# Patient Record
Sex: FEMALE | Race: WHITE | URBAN - METROPOLITAN AREA
[De-identification: names, ages, dates, MRNs, and addresses within clinical notes are randomized per-mention and may not be internally consistent; named-entity substitution may affect disease eponyms.]

---

## 2017-06-02 ENCOUNTER — OUTPATIENT (OUTPATIENT)
Dept: OUTPATIENT SERVICES | Facility: HOSPITAL | Age: 67
LOS: 1 days | Discharge: HOME | End: 2017-06-02

## 2017-06-28 DIAGNOSIS — E03.9 HYPOTHYROIDISM, UNSPECIFIED: ICD-10-CM

## 2017-06-28 DIAGNOSIS — E78.5 HYPERLIPIDEMIA, UNSPECIFIED: ICD-10-CM

## 2017-07-28 ENCOUNTER — OUTPATIENT (OUTPATIENT)
Dept: OUTPATIENT SERVICES | Facility: HOSPITAL | Age: 67
LOS: 1 days | Discharge: HOME | End: 2017-07-28

## 2017-07-28 DIAGNOSIS — Z12.31 ENCOUNTER FOR SCREENING MAMMOGRAM FOR MALIGNANT NEOPLASM OF BREAST: ICD-10-CM

## 2017-07-31 DIAGNOSIS — Z13.820 ENCOUNTER FOR SCREENING FOR OSTEOPOROSIS: ICD-10-CM

## 2017-07-31 DIAGNOSIS — Z78.0 ASYMPTOMATIC MENOPAUSAL STATE: ICD-10-CM

## 2017-07-31 DIAGNOSIS — M81.0 AGE-RELATED OSTEOPOROSIS WITHOUT CURRENT PATHOLOGICAL FRACTURE: ICD-10-CM

## 2017-10-03 ENCOUNTER — OUTPATIENT (OUTPATIENT)
Dept: OUTPATIENT SERVICES | Facility: HOSPITAL | Age: 67
LOS: 1 days | Discharge: HOME | End: 2017-10-03

## 2017-10-03 DIAGNOSIS — E55.9 VITAMIN D DEFICIENCY, UNSPECIFIED: ICD-10-CM

## 2017-10-03 DIAGNOSIS — E78.5 HYPERLIPIDEMIA, UNSPECIFIED: ICD-10-CM

## 2017-10-03 DIAGNOSIS — E03.9 HYPOTHYROIDISM, UNSPECIFIED: ICD-10-CM

## 2018-01-09 ENCOUNTER — OUTPATIENT (OUTPATIENT)
Dept: OUTPATIENT SERVICES | Facility: HOSPITAL | Age: 68
LOS: 1 days | Discharge: HOME | End: 2018-01-09

## 2018-01-09 DIAGNOSIS — M81.0 AGE-RELATED OSTEOPOROSIS WITHOUT CURRENT PATHOLOGICAL FRACTURE: ICD-10-CM

## 2018-01-09 DIAGNOSIS — E06.3 AUTOIMMUNE THYROIDITIS: ICD-10-CM

## 2018-01-09 DIAGNOSIS — E53.8 DEFICIENCY OF OTHER SPECIFIED B GROUP VITAMINS: ICD-10-CM

## 2018-01-09 DIAGNOSIS — E78.2 MIXED HYPERLIPIDEMIA: ICD-10-CM

## 2018-01-09 DIAGNOSIS — R53.83 OTHER FATIGUE: ICD-10-CM

## 2018-01-09 DIAGNOSIS — E55.9 VITAMIN D DEFICIENCY, UNSPECIFIED: ICD-10-CM

## 2018-05-02 ENCOUNTER — OUTPATIENT (OUTPATIENT)
Dept: OUTPATIENT SERVICES | Facility: HOSPITAL | Age: 68
LOS: 1 days | Discharge: HOME | End: 2018-05-02

## 2018-05-02 DIAGNOSIS — R53.83 OTHER FATIGUE: ICD-10-CM

## 2018-09-04 ENCOUNTER — OUTPATIENT (OUTPATIENT)
Dept: OUTPATIENT SERVICES | Facility: HOSPITAL | Age: 68
LOS: 1 days | Discharge: HOME | End: 2018-09-04

## 2018-09-04 DIAGNOSIS — E83.52 HYPERCALCEMIA: ICD-10-CM

## 2018-09-04 DIAGNOSIS — R53.83 OTHER FATIGUE: ICD-10-CM

## 2018-09-04 DIAGNOSIS — M81.0 AGE-RELATED OSTEOPOROSIS WITHOUT CURRENT PATHOLOGICAL FRACTURE: ICD-10-CM

## 2018-09-04 DIAGNOSIS — E78.2 MIXED HYPERLIPIDEMIA: ICD-10-CM

## 2018-09-04 DIAGNOSIS — I10 ESSENTIAL (PRIMARY) HYPERTENSION: ICD-10-CM

## 2018-12-03 ENCOUNTER — OUTPATIENT (OUTPATIENT)
Dept: OUTPATIENT SERVICES | Facility: HOSPITAL | Age: 68
LOS: 1 days | Discharge: HOME | End: 2018-12-03

## 2018-12-03 DIAGNOSIS — E78.5 HYPERLIPIDEMIA, UNSPECIFIED: ICD-10-CM

## 2018-12-03 DIAGNOSIS — E03.9 HYPOTHYROIDISM, UNSPECIFIED: ICD-10-CM

## 2019-05-09 ENCOUNTER — OUTPATIENT (OUTPATIENT)
Dept: OUTPATIENT SERVICES | Facility: HOSPITAL | Age: 69
LOS: 1 days | Discharge: HOME | End: 2019-05-09

## 2019-05-09 DIAGNOSIS — R53.83 OTHER FATIGUE: ICD-10-CM

## 2019-05-09 DIAGNOSIS — E78.2 MIXED HYPERLIPIDEMIA: ICD-10-CM

## 2019-05-09 DIAGNOSIS — E06.3 AUTOIMMUNE THYROIDITIS: ICD-10-CM

## 2019-07-08 ENCOUNTER — OUTPATIENT (OUTPATIENT)
Dept: OUTPATIENT SERVICES | Facility: HOSPITAL | Age: 69
LOS: 1 days | Discharge: HOME | End: 2019-07-08
Payer: MEDICARE

## 2019-07-08 DIAGNOSIS — Z12.31 ENCOUNTER FOR SCREENING MAMMOGRAM FOR MALIGNANT NEOPLASM OF BREAST: ICD-10-CM

## 2019-07-08 PROCEDURE — 77063 BREAST TOMOSYNTHESIS BI: CPT | Mod: 26

## 2019-07-08 PROCEDURE — 77067 SCR MAMMO BI INCL CAD: CPT | Mod: 26

## 2019-08-30 ENCOUNTER — TRANSCRIPTION ENCOUNTER (OUTPATIENT)
Age: 69
End: 2019-08-30

## 2019-09-03 ENCOUNTER — OUTPATIENT (OUTPATIENT)
Dept: OUTPATIENT SERVICES | Facility: HOSPITAL | Age: 69
LOS: 1 days | Discharge: HOME | End: 2019-09-03

## 2019-09-03 DIAGNOSIS — E78.5 HYPERLIPIDEMIA, UNSPECIFIED: ICD-10-CM

## 2019-09-03 DIAGNOSIS — E83.32 HEREDITARY VITAMIN D-DEPENDENT RICKETS (TYPE 1) (TYPE 2): ICD-10-CM

## 2019-09-03 DIAGNOSIS — I10 ESSENTIAL (PRIMARY) HYPERTENSION: ICD-10-CM

## 2019-09-17 ENCOUNTER — OUTPATIENT (OUTPATIENT)
Dept: OUTPATIENT SERVICES | Facility: HOSPITAL | Age: 69
LOS: 1 days | Discharge: HOME | End: 2019-09-17

## 2019-09-17 DIAGNOSIS — I10 ESSENTIAL (PRIMARY) HYPERTENSION: ICD-10-CM

## 2019-09-17 DIAGNOSIS — E78.2 MIXED HYPERLIPIDEMIA: ICD-10-CM

## 2019-09-17 DIAGNOSIS — E83.32 HEREDITARY VITAMIN D-DEPENDENT RICKETS (TYPE 1) (TYPE 2): ICD-10-CM

## 2019-09-22 ENCOUNTER — INPATIENT (INPATIENT)
Facility: HOSPITAL | Age: 69
LOS: 2 days | Discharge: HOME HEALTH | End: 2019-09-25
Attending: SURGERY | Admitting: SURGERY
Payer: MEDICARE

## 2019-09-22 VITALS
SYSTOLIC BLOOD PRESSURE: 116 MMHG | HEART RATE: 81 BPM | TEMPERATURE: 96 F | DIASTOLIC BLOOD PRESSURE: 61 MMHG | RESPIRATION RATE: 18 BRPM | OXYGEN SATURATION: 97 %

## 2019-09-22 LAB
ALBUMIN SERPL ELPH-MCNC: 4.9 G/DL — SIGNIFICANT CHANGE UP (ref 3.5–5.2)
ALP SERPL-CCNC: 66 U/L — SIGNIFICANT CHANGE UP (ref 30–115)
ALT FLD-CCNC: 21 U/L — SIGNIFICANT CHANGE UP (ref 0–41)
ANION GAP SERPL CALC-SCNC: 15 MMOL/L — HIGH (ref 7–14)
APTT BLD: 29.8 SEC — SIGNIFICANT CHANGE UP (ref 27–39.2)
AST SERPL-CCNC: 30 U/L — SIGNIFICANT CHANGE UP (ref 0–41)
BASOPHILS # BLD AUTO: 0.05 K/UL — SIGNIFICANT CHANGE UP (ref 0–0.2)
BASOPHILS NFR BLD AUTO: 0.4 % — SIGNIFICANT CHANGE UP (ref 0–1)
BILIRUB SERPL-MCNC: 0.6 MG/DL — SIGNIFICANT CHANGE UP (ref 0.2–1.2)
BUN SERPL-MCNC: 14 MG/DL — SIGNIFICANT CHANGE UP (ref 10–20)
CALCIUM SERPL-MCNC: 10 MG/DL — SIGNIFICANT CHANGE UP (ref 8.5–10.1)
CHLORIDE SERPL-SCNC: 105 MMOL/L — SIGNIFICANT CHANGE UP (ref 98–110)
CO2 SERPL-SCNC: 24 MMOL/L — SIGNIFICANT CHANGE UP (ref 17–32)
CREAT SERPL-MCNC: 0.8 MG/DL — SIGNIFICANT CHANGE UP (ref 0.7–1.5)
EOSINOPHIL # BLD AUTO: 0.04 K/UL — SIGNIFICANT CHANGE UP (ref 0–0.7)
EOSINOPHIL NFR BLD AUTO: 0.3 % — SIGNIFICANT CHANGE UP (ref 0–8)
GLUCOSE SERPL-MCNC: 102 MG/DL — HIGH (ref 70–99)
HCT VFR BLD CALC: 39.5 % — SIGNIFICANT CHANGE UP (ref 37–47)
HGB BLD-MCNC: 13.4 G/DL — SIGNIFICANT CHANGE UP (ref 12–16)
IMM GRANULOCYTES NFR BLD AUTO: 0.4 % — HIGH (ref 0.1–0.3)
INR BLD: 0.93 RATIO — SIGNIFICANT CHANGE UP (ref 0.65–1.3)
LYMPHOCYTES # BLD AUTO: 0.83 K/UL — LOW (ref 1.2–3.4)
LYMPHOCYTES # BLD AUTO: 6.3 % — LOW (ref 20.5–51.1)
MCHC RBC-ENTMCNC: 30.3 PG — SIGNIFICANT CHANGE UP (ref 27–31)
MCHC RBC-ENTMCNC: 33.9 G/DL — SIGNIFICANT CHANGE UP (ref 32–37)
MCV RBC AUTO: 89.4 FL — SIGNIFICANT CHANGE UP (ref 81–99)
MONOCYTES # BLD AUTO: 0.58 K/UL — SIGNIFICANT CHANGE UP (ref 0.1–0.6)
MONOCYTES NFR BLD AUTO: 4.4 % — SIGNIFICANT CHANGE UP (ref 1.7–9.3)
NEUTROPHILS # BLD AUTO: 11.54 K/UL — HIGH (ref 1.4–6.5)
NEUTROPHILS NFR BLD AUTO: 88.2 % — HIGH (ref 42.2–75.2)
NRBC # BLD: 0 /100 WBCS — SIGNIFICANT CHANGE UP (ref 0–0)
PLATELET # BLD AUTO: 279 K/UL — SIGNIFICANT CHANGE UP (ref 130–400)
POTASSIUM SERPL-MCNC: 4.1 MMOL/L — SIGNIFICANT CHANGE UP (ref 3.5–5)
POTASSIUM SERPL-SCNC: 4.1 MMOL/L — SIGNIFICANT CHANGE UP (ref 3.5–5)
PROT SERPL-MCNC: 7.4 G/DL — SIGNIFICANT CHANGE UP (ref 6–8)
PROTHROM AB SERPL-ACNC: 10.7 SEC — SIGNIFICANT CHANGE UP (ref 9.95–12.87)
RBC # BLD: 4.42 M/UL — SIGNIFICANT CHANGE UP (ref 4.2–5.4)
RBC # FLD: 12.7 % — SIGNIFICANT CHANGE UP (ref 11.5–14.5)
SODIUM SERPL-SCNC: 144 MMOL/L — SIGNIFICANT CHANGE UP (ref 135–146)
WBC # BLD: 13.09 K/UL — HIGH (ref 4.8–10.8)
WBC # FLD AUTO: 13.09 K/UL — HIGH (ref 4.8–10.8)

## 2019-09-22 PROCEDURE — 70450 CT HEAD/BRAIN W/O DYE: CPT | Mod: 26

## 2019-09-22 PROCEDURE — 72125 CT NECK SPINE W/O DYE: CPT | Mod: 26

## 2019-09-22 PROCEDURE — 73552 X-RAY EXAM OF FEMUR 2/>: CPT | Mod: 26,LT

## 2019-09-22 PROCEDURE — 71045 X-RAY EXAM CHEST 1 VIEW: CPT | Mod: 26

## 2019-09-22 PROCEDURE — 32551 INSERTION OF CHEST TUBE: CPT

## 2019-09-22 PROCEDURE — 71260 CT THORAX DX C+: CPT | Mod: 26

## 2019-09-22 PROCEDURE — 99223 1ST HOSP IP/OBS HIGH 75: CPT

## 2019-09-22 PROCEDURE — 99231 SBSQ HOSP IP/OBS SF/LOW 25: CPT

## 2019-09-22 PROCEDURE — 93010 ELECTROCARDIOGRAM REPORT: CPT

## 2019-09-22 PROCEDURE — 71045 X-RAY EXAM CHEST 1 VIEW: CPT | Mod: 26,77

## 2019-09-22 PROCEDURE — 99285 EMERGENCY DEPT VISIT HI MDM: CPT | Mod: 25

## 2019-09-22 PROCEDURE — 74177 CT ABD & PELVIS W/CONTRAST: CPT | Mod: 26

## 2019-09-22 PROCEDURE — 73502 X-RAY EXAM HIP UNI 2-3 VIEWS: CPT | Mod: 26,LT

## 2019-09-22 RX ORDER — AMLODIPINE BESYLATE 2.5 MG/1
2.5 TABLET ORAL DAILY
Refills: 0 | Status: DISCONTINUED | OUTPATIENT
Start: 2019-09-22 | End: 2019-09-23

## 2019-09-22 RX ORDER — OXYCODONE HYDROCHLORIDE 5 MG/1
5 TABLET ORAL EVERY 6 HOURS
Refills: 0 | Status: DISCONTINUED | OUTPATIENT
Start: 2019-09-22 | End: 2019-09-23

## 2019-09-22 RX ORDER — IBUPROFEN 200 MG
600 TABLET ORAL EVERY 8 HOURS
Refills: 0 | Status: DISCONTINUED | OUTPATIENT
Start: 2019-09-22 | End: 2019-09-23

## 2019-09-22 RX ORDER — ACETAMINOPHEN 500 MG
975 TABLET ORAL ONCE
Refills: 0 | Status: COMPLETED | OUTPATIENT
Start: 2019-09-22 | End: 2019-09-22

## 2019-09-22 RX ORDER — PANTOPRAZOLE SODIUM 20 MG/1
40 TABLET, DELAYED RELEASE ORAL
Refills: 0 | Status: DISCONTINUED | OUTPATIENT
Start: 2019-09-22 | End: 2019-09-23

## 2019-09-22 RX ORDER — CHLORHEXIDINE GLUCONATE 213 G/1000ML
1 SOLUTION TOPICAL
Refills: 0 | Status: DISCONTINUED | OUTPATIENT
Start: 2019-09-22 | End: 2019-09-23

## 2019-09-22 RX ORDER — AMLODIPINE BESYLATE 2.5 MG/1
0 TABLET ORAL
Qty: 0 | Refills: 0 | DISCHARGE

## 2019-09-22 RX ORDER — SIMVASTATIN 20 MG/1
0 TABLET, FILM COATED ORAL
Qty: 0 | Refills: 0 | DISCHARGE

## 2019-09-22 RX ORDER — MORPHINE SULFATE 50 MG/1
4 CAPSULE, EXTENDED RELEASE ORAL ONCE
Refills: 0 | Status: DISCONTINUED | OUTPATIENT
Start: 2019-09-22 | End: 2019-09-22

## 2019-09-22 RX ORDER — SIMVASTATIN 20 MG/1
20 TABLET, FILM COATED ORAL AT BEDTIME
Refills: 0 | Status: DISCONTINUED | OUTPATIENT
Start: 2019-09-22 | End: 2019-09-23

## 2019-09-22 RX ORDER — SODIUM CHLORIDE 9 MG/ML
1000 INJECTION INTRAMUSCULAR; INTRAVENOUS; SUBCUTANEOUS
Refills: 0 | Status: DISCONTINUED | OUTPATIENT
Start: 2019-09-22 | End: 2019-09-23

## 2019-09-22 RX ORDER — IBUPROFEN 200 MG
600 TABLET ORAL ONCE
Refills: 0 | Status: COMPLETED | OUTPATIENT
Start: 2019-09-22 | End: 2019-09-22

## 2019-09-22 RX ORDER — HEPARIN SODIUM 5000 [USP'U]/ML
5000 INJECTION INTRAVENOUS; SUBCUTANEOUS EVERY 8 HOURS
Refills: 0 | Status: DISCONTINUED | OUTPATIENT
Start: 2019-09-22 | End: 2019-09-23

## 2019-09-22 RX ORDER — ACETAMINOPHEN 500 MG
650 TABLET ORAL EVERY 6 HOURS
Refills: 0 | Status: DISCONTINUED | OUTPATIENT
Start: 2019-09-22 | End: 2019-09-23

## 2019-09-22 RX ADMIN — Medication 600 MILLIGRAM(S): at 12:37

## 2019-09-22 RX ADMIN — MORPHINE SULFATE 4 MILLIGRAM(S): 50 CAPSULE, EXTENDED RELEASE ORAL at 15:33

## 2019-09-22 RX ADMIN — HEPARIN SODIUM 5000 UNIT(S): 5000 INJECTION INTRAVENOUS; SUBCUTANEOUS at 21:07

## 2019-09-22 RX ADMIN — Medication 975 MILLIGRAM(S): at 12:37

## 2019-09-22 RX ADMIN — Medication 600 MILLIGRAM(S): at 21:07

## 2019-09-22 RX ADMIN — SODIUM CHLORIDE 125 MILLILITER(S): 9 INJECTION INTRAMUSCULAR; INTRAVENOUS; SUBCUTANEOUS at 22:19

## 2019-09-22 RX ADMIN — MORPHINE SULFATE 4 MILLIGRAM(S): 50 CAPSULE, EXTENDED RELEASE ORAL at 18:00

## 2019-09-22 RX ADMIN — SIMVASTATIN 20 MILLIGRAM(S): 20 TABLET, FILM COATED ORAL at 21:04

## 2019-09-22 RX ADMIN — OXYCODONE HYDROCHLORIDE 5 MILLIGRAM(S): 5 TABLET ORAL at 22:19

## 2019-09-22 NOTE — H&P ADULT - ASSESSMENT
68 year old female s/p mechanical fall with left subcapital femoral neck fracture and left pneumothorax on chest xray s/p chest tube in good placement on CT with evidence of bullous disease post-tube placement    -admit to trauma  -ortho consult for hip fracture  -medical optimization/clearence for OR  -CT surgery for bullous disease  -pain control   -chest tube to suction  -pain control  -npo/iv  -pt/re/sw    d/w Dr. Beebe

## 2019-09-22 NOTE — CONSULT NOTE ADULT - SUBJECTIVE AND OBJECTIVE BOX
Orthopedic Consult    Called 1:32, Seen 2:10 PM    HPI: Ms. Palencia is a 69 y/o F who was walking outside when tripped and fell. States was purely mechanical fall. Denies hitting head or LOC.     PMH: HTN, HLD  PSH: None  All: NKDA  Meds: Simvastatin, Amlodipine  SH: Walks without assistance    AFVSS    PE  NAD  Respirations non labored  Appropriate mood and affect    LLE  TTP in groin/over GT  Skin intact  Pain with log roll of hip  EHL/FHL/TA/GS motor intact  SILT T/DP/Sp  2+ DP pulse    Imaging: Plain radiographs demonstrate a left displaced femoral neck fracture    A/P  68 y/o F with L femoral neck fracture  -Discussed with patient left hip mike vs total hip arthroplasty and pt agreeable with plan  -Patient to be admitted to medical team of optimization/risk stratification/clearance  -Pt has been NPO since AM today - pt added on for OR and if cleared may be able to bring to OR this afternoon/evening  -Maintain NPO  -IVF  -analgesia  -DVT PPX  -F/U labs, EKG, CXR, full length femur films

## 2019-09-22 NOTE — ED PROVIDER NOTE - PROGRESS NOTE DETAILS
+ femoral neck Frx. Discussed case w/ Ortho Resident Nina. Added xray L femur. Plan to admit to med. CXR + large PTX. Pt reassessed, remains hemodynamically stable, no reps distress, chest non tender. Trauma consult placed, cased discussed w/ surgery. Pan scan ordered. Pt moved to CRIT for further management. Case signed out to Dr. Cuevas PT SIGNED OUT TO ME. TRAUMA AT BEDSIDE. WILL PLACE CHEST TUBE AND CT SCAN AFTERWARDS. L CHEST TUBE PLACE, CXR WITH GOOD POSITION. LUNG NOT RE EXPANDED. DR. GRAY AT PTS BEDSIDE. PT SIGNED OUT TO DR. MOLINA, FOLLOW UP CT SCANS, REASSESS AND DISPO.

## 2019-09-22 NOTE — ED ADULT NURSE NOTE - NSIMPLEMENTINTERV_GEN_ALL_ED
Implemented All Universal Safety Interventions:  [place] to call system. Call bell, personal items and telephone within reach. Instruct patient to call for assistance. Room bathroom lighting operational. Non-slip footwear when patient is off stretcher. Physically safe environment: no spills, clutter or unnecessary equipment. Stretcher in lowest position, wheels locked, appropriate side rails in place.

## 2019-09-22 NOTE — CONSULT NOTE ADULT - ASSESSMENT
ASSESSMENT:  67 y/o female with PMHx of HTN, HLD presents to ED s/p trip and fall today. -HT, -LOC, -AC. She is complaing of left hip pain.  She was found to have left femoral neck fracture and large left pneumothorax    PLAN:    - Left sidded pigtail  - CT chest and CTAP after the pigtail    -  d/w Dr Ross ASSESSMENT:  67 y/o female with PMHx of HTN, HLD presents to ED s/p trip and fall today. -HT, -LOC, -AC. She is complaing of left hip pain.  She was found to have left femoral neck fracture and large left pneumothorax, s/p pigtail placement    PLAN:    - follow up PAN scan    -  d/w Dr Ross

## 2019-09-22 NOTE — H&P ADULT - NSHPLABSRESULTS_GEN_ALL_CORE
Labs:                        13.4   13.09 )-----------( 279      ( 22 Sep 2019 14:40 )             39.5       Auto Neutrophil %: 88.2 % (09-22-19 @ 14:40)  Auto Immature Granulocyte %: 0.4 % (09-22-19 @ 14:40)    09-22    144  |  105  |  14  ----------------------------<  102<H>  4.1   |  24  |  0.8      Calcium, Total Serum: 10.0 mg/dL (09-22-19 @ 14:40)      LFTs:             7.4  | 0.6  | 30       ------------------[66      ( 22 Sep 2019 14:40 )  4.9  | x    | 21              Coags:     10.70  ----< 0.93    ( 22 Sep 2019 14:40 )     29.8          < from: CT Head No Cont (09.22.19 @ 18:36) >  Impression:    No mass effect or intracranial hemorrhage.   Chronic microvascular ischemic changes.  < end of copied text >    < from: CT Cervical Spine No Cont (09.22.19 @ 18:36) >  IMPRESSION:  No acute fracture or significant subluxation of the cervical spine.  Partially imaged is a large left pneumothorax, status post chest tube   placement.  < end of copied text >    < from: CT Chest w/ IV Cont (09.22.19 @ 18:37) >  IMPRESSION:   Large left hydropneumothorax in the setting of bullous disease, status   post chest tube placement.  Left subcapital femoral neck fracture.  Markedly distended urinary bladder, correlate for urinary retention.  < end of copied text >    < from: CT Abdomen and Pelvis w/ IV Cont (09.22.19 @ 18:37) >  IMPRESSION:   Large left hydropneumothorax in the setting of bullous disease, status   post chest tube placement.  Left subcapital femoral neck fracture.  Markedly distended urinary bladder, correlate for urinary retention.  < end of copied text >    < from: Xray Chest 1 View- PORTABLE-Urgent (09.22.19 @ 15:00) >  Impression:    Large left pneumothorax.  < end of copied text >    < from: Xray Hip 2-3 Views, Left (09.22.19 @ 13:20) >  IMPRESSION:  Left femoral subcapital fracture.  < end of copied text > (3) no apparent problem

## 2019-09-22 NOTE — ED ADULT NURSE REASSESSMENT NOTE - NS ED NURSE REASSESS COMMENT FT1
Patient upgraded to Crit area after X ray of chest found large Left sided Pneumothorax. Patient appears comfortable and in no distress. VSS

## 2019-09-22 NOTE — CONSULT NOTE ADULT - ASSESSMENT
ASSESSMENT:  67 y/o female with PMHx of HTN, HLD presents to ED s/p trip and fall today. -HT, -LOC, -AC. She is complaing of left hip pain.  She was found to have left femoral neck fracture and large left pneumothorax, s/p pigtail placement.     PLAN:    - follow up PAN scan  - daily cxr   - incentive spirometry ASSESSMENT:  69 y/o female with PMHx of HTN, HLD presents to ED s/p trip and fall today. -HT, -LOC, -AC. She is complaing of left hip pain.  She was found to have left femoral neck fracture and large left pneumothorax, s/p left chest tube placement.     PLAN:    - follow up PAN scan  - daily cxr   - incentive spirometry   Senior Note  I have personally examined and evaluated the patient  I agree with the above plan and note  Surgical Attending aware and agrees with plan ASSESSMENT:  67 y/o female s/p fall with left femoral neck fracture and large left pneumothorax, s/p left chest tube placement, good placement of tube on CT w/ evidence of bullous disease    PLAN:    -chest tube to suction  -attending to reevaluate patient in AM    d/w Dr. Valdovinos      Senior Note  I have personally examined and evaluated the patient  I agree with the above plan and note  Surgical Attending aware and agrees with plan

## 2019-09-22 NOTE — ED PROVIDER NOTE - PHYSICAL EXAMINATION
CONSTITUTIONAL: NAD  SKIN: Warm dry  HEAD: NCAT  EYES: NL inspection  ENT: MMM  NECK: Supple; non tender.  CARD: RRR  RESP: CTAB  CHEST: non ttp  ABD: S/NT no R/G; pelvis stable  EXT: + ttp L hip; LLE shortened and ext rotated; DP pulses 2+ symmetric  NEURO: Grossly unremarkable; NL sensation; moves toes well  PSYCH: Cooperative, appropriate.

## 2019-09-22 NOTE — ED PROVIDER NOTE - OBJECTIVE STATEMENT
69 y/o F p/w acute onset, non radiating, moderate to severe, left hip pain s/p mechanical fall about 1 hour prior to arrival today. No head injury, LOC, neck pain, neuro deficit. could not ambulate after.

## 2019-09-22 NOTE — H&P ADULT - NSHPPHYSICALEXAM_GEN_ALL_CORE
Vital Signs:  T(C): 36.2 (22 Sep 2019 16:44), Max: 36.2 (22 Sep 2019 16:44)  T(F): 97.1 (22 Sep 2019 16:44), Max: 97.1 (22 Sep 2019 16:44)  HR: 100 (22 Sep 2019 18:30) (81 - 104)  BP: 161/79 (22 Sep 2019 18:30) (116/61 - 161/79)  RR: 18 (22 Sep 2019 18:30) (18 - 18)  SpO2: 96% (22 Sep 2019 18:30) (96% - 97%)    Primary Survey:    A - airway intact  B - decreased breath sound on the left  C - palpable pulses in all extremities  D - GCS 15 on arrival, SUGGS  Exposure obtained    Secondary Survey:   General: NAD  HEENT: Normocephalic, atraumatic, EOMI, PEERLA. no scalp lacerations   Neck: Soft, midline trachea. no cspine tenderness  Chest: decreased breath sounds on the left  Cardiac: S1, S2, RRR  Abdomen: Soft, non-distended, non-tender, no rebound,   Groin:, pelvis stable   Ext: left hip tenderness. palp radial b/l UE, b/l DP palp in Lower Extrem.   Back: no TTP, no palpable runoff/stepoff/deformity

## 2019-09-22 NOTE — CHART NOTE - NSCHARTNOTEFT_GEN_A_CORE
Senior Note  Called for consult for eval s/p fall w/ no complaints  Attending has already spoke to CT attending recommending CT if hemodynamically stable . ED attending will speak to CT attending on call for further eval  trauma consult called   will evaluate pt Senior Note  Called for consult for eval s/p fall w/ no complaints  chest x ray show pneumothorax , no chest tube at this time   Attending has already spoke to CT attending recommending CT if hemodynamically stable . ED attending will speak to CT attending on call for further eval  trauma consult called   will evaluate pt

## 2019-09-22 NOTE — ED ADULT NURSE NOTE - OBJECTIVE STATEMENT
pt presents with left thigh pain s/p mechanical fall . pt denies head trauma, loc or being on any anticoagulant. No sign of obvious trauma or dislocation. pt states, hasn't been able to ambulate since the fall due to pain. Bilateral lower extremities pulses equal and intact .

## 2019-09-22 NOTE — ED PROVIDER NOTE - CLINICAL SUMMARY MEDICAL DECISION MAKING FREE TEXT BOX
I personally evaluated the patient. I reviewed the Resident’s or Physician Assistant’s note (as assigned above), and agree with the findings and plan except as documented in my note. Patient signed out to me by Dr. Cuevas, Chest tube placed by previous team. Patient will be admitted to surgical service. Patient in no respiratory distress upon admission

## 2019-09-22 NOTE — CONSULT NOTE ADULT - SUBJECTIVE AND OBJECTIVE BOX
68y f  68y f    TRAUMA ACTIVATION LEVEL:  trauma alert    MECHANISM OF INJURY:      [] Blunt  	[] MVC	[x] Fall	[] Pedestrian Struck	[] Motorcycle   [] Assault   [] Bicycle collision  [] Sports injury     [] Penetrating  	[] Gun Shot Wound 		[] Stab Wound    GCS: 	E: 4	V: 5	M: 6    68y old f s/p  HPI: 67 y/o female with PMHx of HTN, HLD presents to ED s/p trip and fall today. -HT, -LOC, -AC. She is complaining of left hip pain. Denies any chest pain, SOB, abdominal pain, neck pain, headache.     PAST MEDICAL & SURGICAL HISTORY:  Osteoarthritis  High cholesterol  HTN (hypertension)    Allergies    No Known Allergies    Intolerances    Home Medications:  amLODIPine:  (22 Sep 2019 12:31)  simvastatin:  (22 Sep 2019 12:31)    ROS: 10-system review is otherwise negative except HPI above.      Primary Survey:    A - airway intact  B - decreased breath sound on the left  C - palpable pulses in all extremities  D - GCS 15 on arrival, SUGGS  Exposure obtained    Vital Signs Last 24 Hrs  T(C): 36.2 (22 Sep 2019 16:44), Max: 36.2 (22 Sep 2019 16:44)  T(F): 97.1 (22 Sep 2019 16:44), Max: 97.1 (22 Sep 2019 16:44)  HR: 104 (22 Sep 2019 16:44) (81 - 104)  BP: 155/81 (22 Sep 2019 16:44) (116/61 - 155/81)  BP(mean): --  RR: 18 (22 Sep 2019 16:44) (18 - 18)  SpO2: 96% (22 Sep 2019 16:44) (96% - 97%)    Secondary Survey:   General: NAD  HEENT: Normocephalic, atraumatic, EOMI, PEERLA. no scalp lacerations   Neck: Soft, midline trachea. no cspine tenderness  Chest: decreased breath sounds on the left  Cardiac: S1, S2, RRR  Respiratory: decreased breath sounds on the left  Abdomen: Soft, non-distended, non-tender, no rebound,   Groin:, pelvis stable   Ext: left hip tenderness. palp radial b/l UE, b/l DP palp in Lower Extrem.   Back: no TTP, no palpable runoff/stepoff/deformity    FAST    Procedures:    LABS:  Labs:  CAPILLARY BLOOD GLUCOSE                        13.4   13.09 )-----------( 279      ( 22 Sep 2019 14:40 )             39.5       Auto Neutrophil %: 88.2 % (09-22-19 @ 14:40)  Auto Immature Granulocyte %: 0.4 % (09-22-19 @ 14:40)    09-22    144  |  105  |  14  ----------------------------<  102<H>  4.1   |  24  |  0.8    Calcium, Total Serum: 10.0 mg/dL (09-22-19 @ 14:40)      LFTs:             7.4  | 0.6  | 30       ------------------[66      ( 22 Sep 2019 14:40 )  4.9  | x    | 21          Lipase:x      Amylase:x             Coags:     10.70  ----< 0.93    ( 22 Sep 2019 14:40 )     29.8        RADIOLOGY & ADDITIONAL STUDIES:  < from: Xray Chest 1 View- PORTABLE-Urgent (09.22.19 @ 15:00) >  Impression:      Large left pneumothorax.    < end of copied text >  < from: Xray Femur 2 Views, Left (09.22.19 @ 14:59) >  impression: Left femoral neck fracture in varus angulation. Remainder of   femur intact.    < end of copied text >    < from: Xray Hip 2-3 Views, Left (09.22.19 @ 13:20) >  IMPRESSION:    Left femoral subcapital fracture.    < end of copied text >    --------------------------------------------------------------------------------------- HPI: 67 y/o female with PMHx of HTN, HLD presents to ED s/p trip and fall today. -HT, -LOC, -AC. She is complaining of left hip pain. Denies any chest pain, SOB, abdominal pain, neck pain, headache.     PAST MEDICAL & SURGICAL HISTORY:  Osteoarthritis  High cholesterol  HTN (hypertension)    Allergies    No Known Allergies    Intolerances    Home Medications:  amLODIPine:  (22 Sep 2019 12:31)  simvastatin:  (22 Sep 2019 12:31)    ROS: 10-system review is otherwise negative except HPI above.      Primary Survey:    A - airway intact  B - decreased breath sound on the left  C - palpable pulses in all extremities  D - GCS 15 on arrival, SUGGS  Exposure obtained    Vital Signs Last 24 Hrs  T(C): 36.2 (22 Sep 2019 16:44), Max: 36.2 (22 Sep 2019 16:44)  T(F): 97.1 (22 Sep 2019 16:44), Max: 97.1 (22 Sep 2019 16:44)  HR: 104 (22 Sep 2019 16:44) (81 - 104)  BP: 155/81 (22 Sep 2019 16:44) (116/61 - 155/81)  RR: 18 (22 Sep 2019 16:44) (18 - 18)  SpO2: 96% (22 Sep 2019 16:44) (96% - 97%)    Secondary Survey:   General: NAD  HEENT: Normocephalic, atraumatic, EOMI, PEERLA. no scalp lacerations   Neck: Soft, midline trachea. no cspine tenderness  Chest: decreased breath sounds on the left  Cardiac: S1, S2, RRR  Respiratory: decreased breath sounds on the left  Abdomen: Soft, non-distended, non-tender, no rebound,   Groin:, pelvis stable   Ext: left hip tenderness. palp radial b/l UE, b/l DP palp in Lower Extrem.   Back: no TTP, no palpable runoff/stepoff/deformity    LABS:               13.4   13.09 )-----------( 279      ( 22 Sep 2019 14:40 )             39.5       Auto Neutrophil %: 88.2 % (09-22-19 @ 14:40)  Auto Immature Granulocyte %: 0.4 % (09-22-19 @ 14:40)    09-22    144  |  105  |  14  ----------------------------<  102<H>  4.1   |  24  |  0.8    Calcium, Total Serum: 10.0 mg/dL (09-22-19 @ 14:40)      LFTs:             7.4  | 0.6  | 30       ------------------[66      ( 22 Sep 2019 14:40 )  4.9  | x    | 21          Coags:     10.70  ----< 0.93    ( 22 Sep 2019 14:40 )     29.8        RADIOLOGY & ADDITIONAL STUDIES:    < from: Xray Chest 1 View- PORTABLE-Urgent (09.22.19 @ 15:00) >  Impression:    Large left pneumothorax.  < end of copied text >    < from: Xray Femur 2 Views, Left (09.22.19 @ 14:59) >  impression: Left femoral neck fracture in varus angulation. Remainder of   femur intact.  < end of copied text >    < from: Xray Hip 2-3 Views, Left (09.22.19 @ 13:20) >  IMPRESSION:  Left femoral subcapital fracture.  < end of copied text >    < from: CT Chest w/ IV Cont (09.22.19 @ 18:37) >  IMPRESSION:   Large left hydropneumothorax in the setting of bullous disease, status   post chest tube placement.  Left subcapital femoral neck fracture.  Markedly distended urinary bladder, correlate for urinary retention.  < end of copied text >

## 2019-09-22 NOTE — ED PROVIDER NOTE - NS ED ROS FT
Constitutional:  No fever  Eyes:  No visual changes  ENMT: No neck pain or stiffness  Cardiac:  No chest pain  Respiratory:  No cough or respiratory distress.   GI:  No nausea, vomiting, diarrhea or abdominal pain.  :  No dysuria, frequency or burning.  MS:  See HPI  Neuro:  No headache   Skin:  No skin rash  Except as documented in the HPI,  all other systems are negative

## 2019-09-22 NOTE — H&P ADULT - HISTORY OF PRESENT ILLNESS
67 y/o female with PMHx of HTN, HLD presents to ED s/p trip and fall today. -HT, -LOC, -AC. She is complain of left hip pain. Denies any chest pain, SOB, abdominal pain, neck pain, headache.

## 2019-09-22 NOTE — H&P ADULT - ATTENDING COMMENTS
69 yo female s/p fall on left side with hip fracture and what appeared to be a large pneumothorax with cardiac shift to the right. Chest tube was placed and ct scan obtained. It turned out patient has a large bleb disease. Thoracic surgery consult and orthopedic consult.   cont chest tube for now.

## 2019-09-22 NOTE — CONSULT NOTE ADULT - SUBJECTIVE AND OBJECTIVE BOX
68y f  68y f    TRAUMA ACTIVATION LEVEL:  trauma alert    MECHANISM OF INJURY:      [] Blunt  	[] MVC	[x] Fall	[] Pedestrian Struck	[] Motorcycle   [] Assault   [] Bicycle collision  [] Sports injury     [] Penetrating  	[] Gun Shot Wound 		[] Stab Wound    GCS: 	E: 4	V: 5	M: 6    68y old f s/p  HPI: 67 y/o female with PMHx of HTN, HLD presents to ED s/p trip and fall today. -HT, -LOC, -AC. She is complaing of left hip pain. Denies any chest pain, SOB, abdominal pain, neck pain, headache.     PAST MEDICAL & SURGICAL HISTORY:  Osteoarthritis  High cholesterol  HTN (hypertension)    Allergies    No Known Allergies    Intolerances    Home Medications:  amLODIPine:  (22 Sep 2019 12:31)  simvastatin:  (22 Sep 2019 12:31)    ROS: 10-system review is otherwise negative except HPI above.      Primary Survey:    A - airway intact  B - decreased breath sound on the left  C - palpable pulses in all extremities  D - GCS 15 on arrival, SUGGS  Exposure obtained    Vital Signs Last 24 Hrs  T(C): 36.2 (22 Sep 2019 16:44), Max: 36.2 (22 Sep 2019 16:44)  T(F): 97.1 (22 Sep 2019 16:44), Max: 97.1 (22 Sep 2019 16:44)  HR: 104 (22 Sep 2019 16:44) (81 - 104)  BP: 155/81 (22 Sep 2019 16:44) (116/61 - 155/81)  BP(mean): --  RR: 18 (22 Sep 2019 16:44) (18 - 18)  SpO2: 96% (22 Sep 2019 16:44) (96% - 97%)    Secondary Survey:   General: NAD  HEENT: Normocephalic, atraumatic, EOMI, PEERLA. no scalp lacerations   Neck: Soft, midline trachea. no cspine tenderness  Chest: decreased breath sounds on the left  Cardiac: S1, S2, RRR  Respiratory: Bilateral breath sounds, clear and equal bilaterally  Abdomen: Soft, non-distended, non-tender, no rebound,   Groin:, pelvis stable   Ext: left hip tenderness. palp radial b/l UE, b/l DP palp in Lower Extrem.   Back: no TTP, no palpable runoff/stepoff/deformity    FAST    Procedures:    LABS:  Labs:  CAPILLARY BLOOD GLUCOSE                        13.4   13.09 )-----------( 279      ( 22 Sep 2019 14:40 )             39.5       Auto Neutrophil %: 88.2 % (09-22-19 @ 14:40)  Auto Immature Granulocyte %: 0.4 % (09-22-19 @ 14:40)    09-22    144  |  105  |  14  ----------------------------<  102<H>  4.1   |  24  |  0.8    Calcium, Total Serum: 10.0 mg/dL (09-22-19 @ 14:40)      LFTs:             7.4  | 0.6  | 30       ------------------[66      ( 22 Sep 2019 14:40 )  4.9  | x    | 21          Lipase:x      Amylase:x             Coags:     10.70  ----< 0.93    ( 22 Sep 2019 14:40 )     29.8        RADIOLOGY & ADDITIONAL STUDIES:  < from: Xray Chest 1 View- PORTABLE-Urgent (09.22.19 @ 15:00) >  Impression:      Large left pneumothorax.    < end of copied text >  < from: Xray Femur 2 Views, Left (09.22.19 @ 14:59) >  impression: Left femoral neck fracture in varus angulation. Remainder of   femur intact.    < end of copied text >    < from: Xray Hip 2-3 Views, Left (09.22.19 @ 13:20) >  IMPRESSION:    Left femoral subcapital fracture.    < end of copied text >    --------------------------------------------------------------------------------------- 68y f  68y f    TRAUMA ACTIVATION LEVEL:  trauma alert    MECHANISM OF INJURY:      [] Blunt  	[] MVC	[x] Fall	[] Pedestrian Struck	[] Motorcycle   [] Assault   [] Bicycle collision  [] Sports injury     [] Penetrating  	[] Gun Shot Wound 		[] Stab Wound    GCS: 	E: 4	V: 5	M: 6    68y old f s/p  HPI: 69 y/o female with PMHx of HTN, HLD presents to ED s/p trip and fall today. -HT, -LOC, -AC. She is complaing of left hip pain. Denies any chest pain, SOB, abdominal pain, neck pain, headache.     PAST MEDICAL & SURGICAL HISTORY:  Osteoarthritis  High cholesterol  HTN (hypertension)    Allergies    No Known Allergies    Intolerances    Home Medications:  amLODIPine:  (22 Sep 2019 12:31)  simvastatin:  (22 Sep 2019 12:31)    ROS: 10-system review is otherwise negative except HPI above.      Primary Survey:    A - airway intact  B - decreased breath sound on the left  C - palpable pulses in all extremities  D - GCS 15 on arrival, SUGGS  Exposure obtained    Vital Signs Last 24 Hrs  T(C): 36.2 (22 Sep 2019 16:44), Max: 36.2 (22 Sep 2019 16:44)  T(F): 97.1 (22 Sep 2019 16:44), Max: 97.1 (22 Sep 2019 16:44)  HR: 104 (22 Sep 2019 16:44) (81 - 104)  BP: 155/81 (22 Sep 2019 16:44) (116/61 - 155/81)  BP(mean): --  RR: 18 (22 Sep 2019 16:44) (18 - 18)  SpO2: 96% (22 Sep 2019 16:44) (96% - 97%)    Secondary Survey:   General: NAD  HEENT: Normocephalic, atraumatic, EOMI, PEERLA. no scalp lacerations   Neck: Soft, midline trachea. no cspine tenderness  Chest: decreased breath sounds on the left  Cardiac: S1, S2, RRR  Abdomen: Soft, non-distended, non-tender, no rebound,   Groin:, pelvis stable   Ext: left hip tenderness. palp radial b/l UE, b/l DP palp in Lower Extrem.   Back: no TTP, no palpable runoff/stepoff/deformity    FAST    Procedures:    LABS:  Labs:  CAPILLARY BLOOD GLUCOSE                        13.4   13.09 )-----------( 279      ( 22 Sep 2019 14:40 )             39.5       Auto Neutrophil %: 88.2 % (09-22-19 @ 14:40)  Auto Immature Granulocyte %: 0.4 % (09-22-19 @ 14:40)    09-22    144  |  105  |  14  ----------------------------<  102<H>  4.1   |  24  |  0.8    Calcium, Total Serum: 10.0 mg/dL (09-22-19 @ 14:40)      LFTs:             7.4  | 0.6  | 30       ------------------[66      ( 22 Sep 2019 14:40 )  4.9  | x    | 21          Lipase:x      Amylase:x             Coags:     10.70  ----< 0.93    ( 22 Sep 2019 14:40 )     29.8        RADIOLOGY & ADDITIONAL STUDIES:  < from: Xray Chest 1 View- PORTABLE-Urgent (09.22.19 @ 15:00) >  Impression:      Large left pneumothorax.    < end of copied text >  < from: Xray Femur 2 Views, Left (09.22.19 @ 14:59) >  impression: Left femoral neck fracture in varus angulation. Remainder of   femur intact.    < end of copied text >    < from: Xray Hip 2-3 Views, Left (09.22.19 @ 13:20) >  IMPRESSION:    Left femoral subcapital fracture.    < end of copied text >    ---------------------------------------------------------------------------------------

## 2019-09-23 LAB
ABO RH CONFIRMATION: SIGNIFICANT CHANGE UP
ANION GAP SERPL CALC-SCNC: 14 MMOL/L — SIGNIFICANT CHANGE UP (ref 7–14)
BASOPHILS # BLD AUTO: 0.02 K/UL — SIGNIFICANT CHANGE UP (ref 0–0.2)
BASOPHILS NFR BLD AUTO: 0.2 % — SIGNIFICANT CHANGE UP (ref 0–1)
BLD GP AB SCN SERPL QL: SIGNIFICANT CHANGE UP
BUN SERPL-MCNC: 13 MG/DL — SIGNIFICANT CHANGE UP (ref 10–20)
CALCIUM SERPL-MCNC: 9.7 MG/DL — SIGNIFICANT CHANGE UP (ref 8.5–10.1)
CHLORIDE SERPL-SCNC: 102 MMOL/L — SIGNIFICANT CHANGE UP (ref 98–110)
CK MB CFR SERPL CALC: 6.1 NG/ML — SIGNIFICANT CHANGE UP (ref 0.6–6.3)
CK SERPL-CCNC: 789 U/L — HIGH (ref 0–225)
CO2 SERPL-SCNC: 23 MMOL/L — SIGNIFICANT CHANGE UP (ref 17–32)
CREAT SERPL-MCNC: 0.7 MG/DL — SIGNIFICANT CHANGE UP (ref 0.7–1.5)
EOSINOPHIL # BLD AUTO: 0.01 K/UL — SIGNIFICANT CHANGE UP (ref 0–0.7)
EOSINOPHIL NFR BLD AUTO: 0.1 % — SIGNIFICANT CHANGE UP (ref 0–8)
GLUCOSE BLDC GLUCOMTR-MCNC: 111 MG/DL — HIGH (ref 70–99)
GLUCOSE BLDC GLUCOMTR-MCNC: 117 MG/DL — HIGH (ref 70–99)
GLUCOSE BLDC GLUCOMTR-MCNC: 94 MG/DL — SIGNIFICANT CHANGE UP (ref 70–99)
GLUCOSE SERPL-MCNC: 131 MG/DL — HIGH (ref 70–99)
HCT VFR BLD CALC: 32.9 % — LOW (ref 37–47)
HCT VFR BLD CALC: 37.9 % — SIGNIFICANT CHANGE UP (ref 37–47)
HGB BLD-MCNC: 10.6 G/DL — LOW (ref 12–16)
HGB BLD-MCNC: 12.4 G/DL — SIGNIFICANT CHANGE UP (ref 12–16)
IMM GRANULOCYTES NFR BLD AUTO: 0.5 % — HIGH (ref 0.1–0.3)
LYMPHOCYTES # BLD AUTO: 0.76 K/UL — LOW (ref 1.2–3.4)
LYMPHOCYTES # BLD AUTO: 7.9 % — LOW (ref 20.5–51.1)
MAGNESIUM SERPL-MCNC: 2.3 MG/DL — SIGNIFICANT CHANGE UP (ref 1.8–2.4)
MCHC RBC-ENTMCNC: 29.4 PG — SIGNIFICANT CHANGE UP (ref 27–31)
MCHC RBC-ENTMCNC: 29.5 PG — SIGNIFICANT CHANGE UP (ref 27–31)
MCHC RBC-ENTMCNC: 32.2 G/DL — SIGNIFICANT CHANGE UP (ref 32–37)
MCHC RBC-ENTMCNC: 32.7 G/DL — SIGNIFICANT CHANGE UP (ref 32–37)
MCV RBC AUTO: 90.2 FL — SIGNIFICANT CHANGE UP (ref 81–99)
MCV RBC AUTO: 91.4 FL — SIGNIFICANT CHANGE UP (ref 81–99)
MONOCYTES # BLD AUTO: 0.8 K/UL — HIGH (ref 0.1–0.6)
MONOCYTES NFR BLD AUTO: 8.3 % — SIGNIFICANT CHANGE UP (ref 1.7–9.3)
NEUTROPHILS # BLD AUTO: 8 K/UL — HIGH (ref 1.4–6.5)
NEUTROPHILS NFR BLD AUTO: 83 % — HIGH (ref 42.2–75.2)
NRBC # BLD: 0 /100 WBCS — SIGNIFICANT CHANGE UP (ref 0–0)
NRBC # BLD: 0 /100 WBCS — SIGNIFICANT CHANGE UP (ref 0–0)
PHOSPHATE SERPL-MCNC: 3.8 MG/DL — SIGNIFICANT CHANGE UP (ref 2.1–4.9)
PLATELET # BLD AUTO: 228 K/UL — SIGNIFICANT CHANGE UP (ref 130–400)
PLATELET # BLD AUTO: 267 K/UL — SIGNIFICANT CHANGE UP (ref 130–400)
POTASSIUM SERPL-MCNC: 3.8 MMOL/L — SIGNIFICANT CHANGE UP (ref 3.5–5)
POTASSIUM SERPL-SCNC: 3.8 MMOL/L — SIGNIFICANT CHANGE UP (ref 3.5–5)
RBC # BLD: 3.6 M/UL — LOW (ref 4.2–5.4)
RBC # BLD: 4.2 M/UL — SIGNIFICANT CHANGE UP (ref 4.2–5.4)
RBC # FLD: 12.8 % — SIGNIFICANT CHANGE UP (ref 11.5–14.5)
RBC # FLD: 13 % — SIGNIFICANT CHANGE UP (ref 11.5–14.5)
SODIUM SERPL-SCNC: 139 MMOL/L — SIGNIFICANT CHANGE UP (ref 135–146)
TROPONIN T SERPL-MCNC: <0.01 NG/ML — SIGNIFICANT CHANGE UP
WBC # BLD: 10.1 K/UL — SIGNIFICANT CHANGE UP (ref 4.8–10.8)
WBC # BLD: 9.64 K/UL — SIGNIFICANT CHANGE UP (ref 4.8–10.8)
WBC # FLD AUTO: 10.1 K/UL — SIGNIFICANT CHANGE UP (ref 4.8–10.8)
WBC # FLD AUTO: 9.64 K/UL — SIGNIFICANT CHANGE UP (ref 4.8–10.8)

## 2019-09-23 PROCEDURE — 99232 SBSQ HOSP IP/OBS MODERATE 35: CPT

## 2019-09-23 PROCEDURE — 71045 X-RAY EXAM CHEST 1 VIEW: CPT | Mod: 26

## 2019-09-23 PROCEDURE — 88305 TISSUE EXAM BY PATHOLOGIST: CPT | Mod: 26

## 2019-09-23 PROCEDURE — 93010 ELECTROCARDIOGRAM REPORT: CPT

## 2019-09-23 PROCEDURE — 88311 DECALCIFY TISSUE: CPT | Mod: 26

## 2019-09-23 PROCEDURE — 99231 SBSQ HOSP IP/OBS SF/LOW 25: CPT

## 2019-09-23 PROCEDURE — 71045 X-RAY EXAM CHEST 1 VIEW: CPT | Mod: 26,77

## 2019-09-23 RX ORDER — PANTOPRAZOLE SODIUM 20 MG/1
40 TABLET, DELAYED RELEASE ORAL
Refills: 0 | Status: DISCONTINUED | OUTPATIENT
Start: 2019-09-23 | End: 2019-09-25

## 2019-09-23 RX ORDER — CHLORHEXIDINE GLUCONATE 213 G/1000ML
1 SOLUTION TOPICAL
Refills: 0 | Status: DISCONTINUED | OUTPATIENT
Start: 2019-09-23 | End: 2019-09-25

## 2019-09-23 RX ORDER — SODIUM CHLORIDE 9 MG/ML
1000 INJECTION, SOLUTION INTRAVENOUS
Refills: 0 | Status: DISCONTINUED | OUTPATIENT
Start: 2019-09-23 | End: 2019-09-24

## 2019-09-23 RX ORDER — ENOXAPARIN SODIUM 100 MG/ML
40 INJECTION SUBCUTANEOUS DAILY
Refills: 0 | Status: DISCONTINUED | OUTPATIENT
Start: 2019-09-24 | End: 2019-09-25

## 2019-09-23 RX ORDER — CEFAZOLIN SODIUM 1 G
1000 VIAL (EA) INJECTION EVERY 8 HOURS
Refills: 0 | Status: DISCONTINUED | OUTPATIENT
Start: 2019-09-24 | End: 2019-09-24

## 2019-09-23 RX ORDER — ONDANSETRON 8 MG/1
4 TABLET, FILM COATED ORAL EVERY 6 HOURS
Refills: 0 | Status: DISCONTINUED | OUTPATIENT
Start: 2019-09-23 | End: 2019-09-25

## 2019-09-23 RX ORDER — MAGNESIUM HYDROXIDE 400 MG/1
30 TABLET, CHEWABLE ORAL DAILY
Refills: 0 | Status: DISCONTINUED | OUTPATIENT
Start: 2019-09-23 | End: 2019-09-25

## 2019-09-23 RX ORDER — SODIUM CHLORIDE 9 MG/ML
1000 INJECTION INTRAMUSCULAR; INTRAVENOUS; SUBCUTANEOUS
Refills: 0 | Status: DISCONTINUED | OUTPATIENT
Start: 2019-09-23 | End: 2019-09-24

## 2019-09-23 RX ORDER — MORPHINE SULFATE 50 MG/1
2 CAPSULE, EXTENDED RELEASE ORAL
Refills: 0 | Status: DISCONTINUED | OUTPATIENT
Start: 2019-09-23 | End: 2019-09-24

## 2019-09-23 RX ORDER — POLYETHYLENE GLYCOL 3350 17 G/17G
17 POWDER, FOR SOLUTION ORAL DAILY
Refills: 0 | Status: DISCONTINUED | OUTPATIENT
Start: 2019-09-23 | End: 2019-09-25

## 2019-09-23 RX ORDER — SIMVASTATIN 20 MG/1
20 TABLET, FILM COATED ORAL AT BEDTIME
Refills: 0 | Status: DISCONTINUED | OUTPATIENT
Start: 2019-09-23 | End: 2019-09-25

## 2019-09-23 RX ORDER — MEPERIDINE HYDROCHLORIDE 50 MG/ML
12.5 INJECTION INTRAMUSCULAR; INTRAVENOUS; SUBCUTANEOUS
Refills: 0 | Status: DISCONTINUED | OUTPATIENT
Start: 2019-09-23 | End: 2019-09-24

## 2019-09-23 RX ORDER — FERROUS SULFATE 325(65) MG
325 TABLET ORAL
Refills: 0 | Status: DISCONTINUED | OUTPATIENT
Start: 2019-09-23 | End: 2019-09-25

## 2019-09-23 RX ORDER — DOCUSATE SODIUM 100 MG
100 CAPSULE ORAL THREE TIMES A DAY
Refills: 0 | Status: DISCONTINUED | OUTPATIENT
Start: 2019-09-23 | End: 2019-09-25

## 2019-09-23 RX ORDER — TRAMADOL HYDROCHLORIDE 50 MG/1
50 TABLET ORAL EVERY 6 HOURS
Refills: 0 | Status: DISCONTINUED | OUTPATIENT
Start: 2019-09-23 | End: 2019-09-25

## 2019-09-23 RX ORDER — SENNA PLUS 8.6 MG/1
2 TABLET ORAL AT BEDTIME
Refills: 0 | Status: DISCONTINUED | OUTPATIENT
Start: 2019-09-23 | End: 2019-09-25

## 2019-09-23 RX ORDER — SODIUM CHLORIDE 9 MG/ML
1000 INJECTION, SOLUTION INTRAVENOUS
Refills: 0 | Status: DISCONTINUED | OUTPATIENT
Start: 2019-09-23 | End: 2019-09-23

## 2019-09-23 RX ORDER — ACETAMINOPHEN 500 MG
650 TABLET ORAL EVERY 6 HOURS
Refills: 0 | Status: DISCONTINUED | OUTPATIENT
Start: 2019-09-23 | End: 2019-09-25

## 2019-09-23 RX ORDER — MORPHINE SULFATE 50 MG/1
1 CAPSULE, EXTENDED RELEASE ORAL
Refills: 0 | Status: DISCONTINUED | OUTPATIENT
Start: 2019-09-23 | End: 2019-09-24

## 2019-09-23 RX ORDER — AMLODIPINE BESYLATE 2.5 MG/1
2.5 TABLET ORAL DAILY
Refills: 0 | Status: DISCONTINUED | OUTPATIENT
Start: 2019-09-24 | End: 2019-09-25

## 2019-09-23 RX ORDER — CELECOXIB 200 MG/1
200 CAPSULE ORAL DAILY
Refills: 0 | Status: DISCONTINUED | OUTPATIENT
Start: 2019-09-25 | End: 2019-09-25

## 2019-09-23 RX ORDER — KETOROLAC TROMETHAMINE 30 MG/ML
15 SYRINGE (ML) INJECTION EVERY 8 HOURS
Refills: 0 | Status: DISCONTINUED | OUTPATIENT
Start: 2019-09-24 | End: 2019-09-24

## 2019-09-23 RX ORDER — ONDANSETRON 8 MG/1
4 TABLET, FILM COATED ORAL ONCE
Refills: 0 | Status: COMPLETED | OUTPATIENT
Start: 2019-09-23 | End: 2019-09-23

## 2019-09-23 RX ADMIN — SIMVASTATIN 20 MILLIGRAM(S): 20 TABLET, FILM COATED ORAL at 22:32

## 2019-09-23 RX ADMIN — Medication 600 MILLIGRAM(S): at 13:52

## 2019-09-23 RX ADMIN — SODIUM CHLORIDE 60 MILLILITER(S): 9 INJECTION INTRAMUSCULAR; INTRAVENOUS; SUBCUTANEOUS at 22:30

## 2019-09-23 RX ADMIN — Medication 650 MILLIGRAM(S): at 23:41

## 2019-09-23 RX ADMIN — Medication 600 MILLIGRAM(S): at 05:54

## 2019-09-23 RX ADMIN — AMLODIPINE BESYLATE 2.5 MILLIGRAM(S): 2.5 TABLET ORAL at 05:54

## 2019-09-23 RX ADMIN — PANTOPRAZOLE SODIUM 40 MILLIGRAM(S): 20 TABLET, DELAYED RELEASE ORAL at 05:55

## 2019-09-23 RX ADMIN — SODIUM CHLORIDE 100 MILLILITER(S): 9 INJECTION, SOLUTION INTRAVENOUS at 20:57

## 2019-09-23 RX ADMIN — ONDANSETRON 4 MILLIGRAM(S): 8 TABLET, FILM COATED ORAL at 21:38

## 2019-09-23 RX ADMIN — Medication 650 MILLIGRAM(S): at 05:54

## 2019-09-23 RX ADMIN — SODIUM CHLORIDE 75 MILLILITER(S): 9 INJECTION, SOLUTION INTRAVENOUS at 05:54

## 2019-09-23 RX ADMIN — Medication 650 MILLIGRAM(S): at 11:53

## 2019-09-23 RX ADMIN — HEPARIN SODIUM 5000 UNIT(S): 5000 INJECTION INTRAVENOUS; SUBCUTANEOUS at 05:54

## 2019-09-23 RX ADMIN — Medication 650 MILLIGRAM(S): at 23:11

## 2019-09-23 NOTE — PRE-ANESTHESIA EVALUATION ADULT - NSANTHADDINFOFT_GEN_ALL_CORE
large lt lung pneumothorax, bullous disease, didn't have lung expansion with CT  prefer Spinal epidural, possible GA

## 2019-09-23 NOTE — PROGRESS NOTE ADULT - ASSESSMENT
69 y/o woman with PMH of OA, HTN and hyperlipidemia presented s/p trip and fall.  She sustained left femoral neck fracture and trauma workup also showed large left hydropneumothorax in the setting of bullous disease. She is admitted to trauma surgery and had left chest tube placed.  She is being followed by orthopedics who plan for OR today.  She was cleared by CT surgery for OR. 67 y/o woman with PMH of OA, HTN and hyperlipidemia presented s/p trip and fall.  She sustained left femoral neck fracture and trauma workup also showed large left hydropneumothorax in the setting of bullous disease. She is admitted to trauma surgery and had left chest tube placed.  She is being followed by orthopedics who plan for OR today.  She was cleared by CT surgery for OR.  She is low to intermediate risk for perioperative cardiovascular complications.    Please call for any questions or concerns.

## 2019-09-23 NOTE — PROGRESS NOTE ADULT - SUBJECTIVE AND OBJECTIVE BOX
GENERAL SURGERY PROGRESS NOTE     ELLIOTSHOSHANA TEJEDA  68y  Female  Hospital day :1d    OVERNIGHT EVENTS: No acute events    T(F): 98.5 (09-22-19 @ 23:46), Max: 98.5 (09-22-19 @ 23:46)  HR: 84 (09-22-19 @ 23:46) (81 - 104)  BP: 170/79 (09-22-19 @ 23:46) (116/61 - 170/79)  RR: 18 (09-22-19 @ 23:46) (18 - 18)  SpO2: 100% (09-22-19 @ 23:46) (96% - 100%)    DIET/FLUIDS: sodium chloride 0.9%. 1000 milliLiter(s) IV Continuous <Continuous>      GI proph:  pantoprazole    Tablet 40 milliGRAM(s) Oral before breakfast    AC/ proph: heparin  Injectable 5000 Unit(s) SubCutaneous every 8 hours    ABx:     PHYSICAL EXAM:  GENERAL: NAD, well-appearing  CHEST/LUNG: Clear to auscultation bilaterally. Left Chest tube  HEART: Regular rate and rhythm  ABDOMEN: Soft, Nontender, Nondistended;   EXTREMITIES:  No clubbing, cyanosis, or edema      LABS  Labs:  CAPILLARY BLOOD GLUCOSE                              13.4   13.09 )-----------( 279      ( 22 Sep 2019 14:40 )             39.5       Auto Neutrophil %: 88.2 % (09-22-19 @ 14:40)  Auto Immature Granulocyte %: 0.4 % (09-22-19 @ 14:40)    09-22    144  |  105  |  14  ----------------------------<  102<H>  4.1   |  24  |  0.8      Calcium, Total Serum: 10.0 mg/dL (09-22-19 @ 14:40)      LFTs:             7.4  | 0.6  | 30       ------------------[66      ( 22 Sep 2019 14:40 )  4.9  | x    | 21          Lipase:x      Amylase:x             Coags:     10.70  ----< 0.93    ( 22 Sep 2019 14:40 )     29.8                        RADIOLOGY & ADDITIONAL TESTS:    < from: CT Chest w/ IV Cont (09.22.19 @ 18:37) >  IMPRESSION:     Large left hydropneumothorax in the setting of bullous disease, status   post chest tube placement.    Left subcapital femoral neck fracture.    Markedly distended urinary bladder, correlate for urinary retention.    < end of copied text >    < from: Xray Hip 2-3 Views, Left (09.22.19 @ 13:20) >  IMPRESSION:    Left femoral subcapital fracture.    < end of copied text >

## 2019-09-23 NOTE — PROGRESS NOTE ADULT - SUBJECTIVE AND OBJECTIVE BOX
ORTHO PROGRESS NOTE     Patient seen and examined at bedside . The patient is awake and alert in NAD. Complaining of L hip pain. Found to have pneumothorax on imaging. CT surgery evaluated and cleared patient for OR with periop recommendations.     MEDICATIONS  (STANDING):  acetaminophen   Tablet .. 650 milliGRAM(s) Oral every 6 hours  amLODIPine   Tablet 2.5 milliGRAM(s) Oral daily  chlorhexidine 4% Liquid 1 Application(s) Topical <User Schedule>  heparin  Injectable 5000 Unit(s) SubCutaneous every 8 hours  ibuprofen  Tablet. 600 milliGRAM(s) Oral every 8 hours  lactated ringers. 1000 milliLiter(s) (75 mL/Hr) IV Continuous <Continuous>  pantoprazole    Tablet 40 milliGRAM(s) Oral before breakfast  simvastatin 20 milliGRAM(s) Oral at bedtime  sodium chloride 0.9%. 1000 milliLiter(s) (125 mL/Hr) IV Continuous <Continuous>    MEDICATIONS  (PRN):  oxyCODONE    IR 5 milliGRAM(s) Oral every 6 hours PRN breakthough      Vital Signs Last 24 Hrs  T(C): 36 (23 Sep 2019 07:59), Max: 36.9 (22 Sep 2019 23:46)  T(F): 96.8 (23 Sep 2019 07:59), Max: 98.5 (22 Sep 2019 23:46)  HR: 64 (23 Sep 2019 07:59) (64 - 104)  BP: 133/67 (23 Sep 2019 07:59) (116/61 - 170/79)  BP(mean): --  RR: 18 (23 Sep 2019 07:59) (18 - 18)  SpO2: 100% (23 Sep 2019 07:59) (96% - 100%)    PE:   Extremity shortened, externally rotated.   Compartments soft and compressible  Motor intact distally  SILT distally  CR<2sec  palpable pulses                               12.4   9.64  )-----------( 267      ( 22 Sep 2019 23:44 )             37.9     09-22    139  |  102  |  13  ----------------------------<  131<H>  3.8   |  23  |  0.7    Ca    9.7      22 Sep 2019 23:44  Phos  3.8     09-22  Mg     2.3     09-22    TPro  7.4  /  Alb  4.9  /  TBili  0.6  /  DBili  x   /  AST  30  /  ALT  21  /  AlkPhos  66  09-22    PT/INR - ( 22 Sep 2019 14:40 )   PT: 10.70 sec;   INR: 0.93 ratio         PTT - ( 22 Sep 2019 14:40 )  PTT:29.8 sec          A/P: 68yFemale with L FN fracture  Plan for OR today.  NPO  Pain control   Bed rest for now  PT/OT  Ext icing  Incentive Spirometry   DVT Prophylaxis. Do not hold for OR.

## 2019-09-23 NOTE — CHART NOTE - NSCHARTNOTEFT_GEN_A_CORE
PACU ANESTHESIA ADMISSION NOTE      Procedure: Left total hip arthroplasty: via direct anterior; CPT code 7130    Post op diagnosis:  Displaced fracture of left femoral neck      ____  Intubated  TV:______       Rate: ______      FiO2: ______    ___x_  Patent Airway    ____  Full return of protective reflexes    ____  Full recovery from anesthesia / back to baseline     Vitals:   T:    98       R:  12                BP:   127/72                Sat:     96%               P:  87      Mental Status:  _x___ Awake   _____ Alert   _____ Drowsy   _____ Sedated    Nausea/Vomiting:  __x__ NO  ______Yes,   See Post - Op Orders          Pain Scale (0-10):  _____    Treatment: ____ None    __x__ See Post - Op/PCA Orders    Post - Operative Fluids:   ____ Oral   __x__ See Post - Op Orders    Plan: Discharge:   ____Home       ___x__Floor     _____Critical Care    _____  Other:_________________    Comments: Uneventful intraoperative course. No anesthesia issues or complications noted.  Patient stable upon arrival to PACU. Report given to RN. Discharge when criteria met.

## 2019-09-23 NOTE — PROGRESS NOTE ADULT - SUBJECTIVE AND OBJECTIVE BOX
ELLIOT, SHOSHANA  68y      Planned Procedure______surgery for left femoral neck fracture_________________________________    Does the patient have the following conditions? Type Y or N    __N__DVT/PE           	  Currently anticoagulated ______ IVC Filter _______ Date:______    __N__DM                             Controlled    Y _______ N _______    __Y__HTN	                              Controlled    Y ___x____ N _______    __N__CAD	                                  Prior MI  _____CABG _____STENT  ______ EF _____    _N___CHF                             Chronic _____ Acute _____ EF ______    __N__Afib	                                  Current Rhythm _________   Current anticoagulation _________    __N__PPM/ICD                         Indication ___________  last Interrogated _________    _N___OSA	                              PAP  Type &Settings _____________    _N___Asthma/COPD	          Last Exac _______ Last Steroid use Date _______     __N__O2 dependent	          Reason ______________    __N__CKD or HARLAN	                  Stable Y _____  N ______    _N___Electrolyte Abn	          Type ___________     Stable Y ______   N _______    __N__Cirrhosis	                  Cause __________     Stable Y ______   N _______    __N__GI Bleed                          Cause __________     Stable Y ______   N _______    _N___Anemia	                          Cause __________     Stable Y ______   N _______    ____Transfusion                  Last date ________      ____ETOH Use	                  Last date________     Intervention __________________________    ____Tobacco Abuse	          Last date ________    Intervention __________________________    ____Drug Use	                 Type____________  Last dose _____ Intervention______________    ____Other                             Details_________________________________________________      PAST MEDICAL & SURGICAL HISTORY:  Osteoarthritis  High cholesterol  HTN (hypertension)    Allergies    No Known Allergies    Intolerances      MEDICATIONS  (STANDING):  acetaminophen   Tablet .. 650 milliGRAM(s) Oral every 6 hours  amLODIPine   Tablet 2.5 milliGRAM(s) Oral daily  chlorhexidine 4% Liquid 1 Application(s) Topical <User Schedule>  heparin  Injectable 5000 Unit(s) SubCutaneous every 8 hours  ibuprofen  Tablet. 600 milliGRAM(s) Oral every 8 hours  lactated ringers. 1000 milliLiter(s) (75 mL/Hr) IV Continuous <Continuous>  pantoprazole    Tablet 40 milliGRAM(s) Oral before breakfast  simvastatin 20 milliGRAM(s) Oral at bedtime  sodium chloride 0.9%. 1000 milliLiter(s) (125 mL/Hr) IV Continuous <Continuous>    MEDICATIONS  (PRN):  oxyCODONE    IR 5 milliGRAM(s) Oral every 6 hours PRN breakthough      FH:  ROS:    D[facility] Activity Status Index: Can the patient climb a flight of stairs or walk uphill?   ______ N   <4 METS   _______ Y > 4 METS    Physical Exam:  Vital Signs Last 24 Hrs  T(C): 36 (23 Sep 2019 07:59), Max: 36.9 (22 Sep 2019 23:46)  T(F): 96.8 (23 Sep 2019 07:59), Max: 98.5 (22 Sep 2019 23:46)  HR: 64 (23 Sep 2019 07:59) (64 - 104)  BP: 133/67 (23 Sep 2019 07:59) (116/61 - 170/79)  BP(mean): --  RR: 18 (23 Sep 2019 07:59) (18 - 18)  SpO2: 100% (23 Sep 2019 07:59) (96% - 100%)    General:    HEENT:    Respiratory:    CVS:    GI:    Ext:    Neuro:     Psych:    LABS AND OTHER PERTINENT TESTS:                          12.4   9.64  )-----------( 267      ( 22 Sep 2019 23:44 )             37.9     09-22    139  |  102  |  13  ----------------------------<  131<H>  3.8   |  23  |  0.7    Ca    9.7      22 Sep 2019 23:44  Phos  3.8     09-22  Mg     2.3     09-22    TPro  7.4  /  Alb  4.9  /  TBili  0.6  /  DBili  x   /  AST  30  /  ALT  21  /  AlkPhos  66  09-22    PT/INR - ( 22 Sep 2019 14:40 )   PT: 10.70 sec;   INR: 0.93 ratio         PTT - ( 22 Sep 2019 14:40 )  PTT:29.8 sec    < from: 12 Lead ECG (09.22.19 @ 14:35) >  Diagnosis Line Sinus rhythm with short UT  Otherwise normal ECG    < end of copied text >    < from: CT Abdomen and Pelvis w/ IV Cont (09.22.19 @ 18:37) >  IMPRESSION:     Large left hydropneumothorax in the setting of bullous disease, status   post chest tube placement.    Left subcapital femoral neck fracture.    Markedly distended urinary bladder, correlate for urinary retention.      < end of copied text >    < from: CT Cervical Spine No Cont (09.22.19 @ 18:36) >  IMPRESSION:    No acute fracture or significant subluxation of the cervical spine.    Partially imaged is a large left pneumothorax, status post chest tube   placement.      < end of copied text >    < from: CT Head No Cont (09.22.19 @ 18:36) >  Impression:      No mass effect or intracranial hemorrhage.     Chronic microvascular ischemic changes.    < end of copied text >    < from: Xray Femur 2 Views, Left (09.22.19 @ 14:59) >  Findings/  impression: Left femoral neck fracture in varus angulation. Remainder of   femur intact.      < end of copied text >      Risk Assessment: (Use One)    [employer] NSQIP Surgical Risk Calculator http://riskcalculator.facs.org/      Revised Cardiac Risk Index       ________  The patient is optimized for surgery/procedure and may proceed to the operating room as scheduled    _________The patient is NOT optimized for surgery/procedure and should not proceed to the operating room as scheduled      Recommendations for optimization:          Anticoagulation Recommendations:     continue heparin SQ perioperatively ELLIOT, SHOSHANA  68y      Planned Procedure______surgery for left femoral neck fracture_________________________________    Does the patient have the following conditions? Type Y or N    __N__DVT/PE           	  Currently anticoagulated ______ IVC Filter _______ Date:______    __N__DM                             Controlled    Y _______ N _______    __Y__HTN	                              Controlled    Y ___x____ N _______    __N__CAD	                                  Prior MI  _____CABG _____STENT  ______ EF _____    _N___CHF                             Chronic _____ Acute _____ EF ______    __N__Afib	                                  Current Rhythm _________   Current anticoagulation _________    __N__PPM/ICD                         Indication ___________  last Interrogated _________    _N___OSA	                              PAP  Type &Settings _____________    _N___Asthma/COPD	          Last Exac _______ Last Steroid use Date _______     __N__O2 dependent	          Reason ______________    __N__CKD or HARLAN	                  Stable Y _____  N ______    _N___Electrolyte Abn	          Type ___________     Stable Y ______   N _______    __N__Cirrhosis	                  Cause __________     Stable Y ______   N _______    __N__GI Bleed                          Cause __________     Stable Y ______   N _______    _N___Anemia	                          Cause __________     Stable Y ______   N _______    _N___Transfusion                  Last date ________      _N___ETOH Use	                  Last date________     Intervention __________________________    _N___Tobacco Abuse   denies any tobacco use  	          Last date ________    Intervention __________________________    _N___Drug Use	                 Type____________  Last dose _____ Intervention______________    ____Other                             Details_________________________________________________      PAST MEDICAL & SURGICAL HISTORY:  Osteoarthritis  High cholesterol  HTN (hypertension)    Allergies    No Known Allergies    Intolerances      MEDICATIONS  (STANDING):  acetaminophen   Tablet .. 650 milliGRAM(s) Oral every 6 hours  amLODIPine   Tablet 2.5 milliGRAM(s) Oral daily  chlorhexidine 4% Liquid 1 Application(s) Topical <User Schedule>  heparin  Injectable 5000 Unit(s) SubCutaneous every 8 hours  ibuprofen  Tablet. 600 milliGRAM(s) Oral every 8 hours  lactated ringers. 1000 milliLiter(s) (75 mL/Hr) IV Continuous <Continuous>  pantoprazole    Tablet 40 milliGRAM(s) Oral before breakfast  simvastatin 20 milliGRAM(s) Oral at bedtime  sodium chloride 0.9%. 1000 milliLiter(s) (125 mL/Hr) IV Continuous <Continuous>    MEDICATIONS  (PRN):  oxyCODONE    IR 5 milliGRAM(s) Oral every 6 hours PRN breakthough      FH: NC  ROS: reviewed and otherwise negative    [facility] Activity Status Index: Can the patient climb a flight of stairs or walk uphill?   ______ N   <4 METS   __x_____ Y > 4 METS    Physical Exam:  Vital Signs Last 24 Hrs  T(C): 36 (23 Sep 2019 07:59), Max: 36.9 (22 Sep 2019 23:46)  T(F): 96.8 (23 Sep 2019 07:59), Max: 98.5 (22 Sep 2019 23:46)  HR: 64 (23 Sep 2019 07:59) (64 - 104)  BP: 133/67 (23 Sep 2019 07:59) (116/61 - 170/79)  BP(mean): --  RR: 18 (23 Sep 2019 07:59) (18 - 18)  SpO2: 100% (23 Sep 2019 07:59) (96% - 100%)    General: thin woman in NAD    HEENT: anicteric    Respiratory: CTA on the right  good BS on the left with chest tube in place    CVS: RRR S1S2 no murmur    GI: soft, NT, ND    Ext: left LE externally rotated    Neuro: moves all extremities (left LE limited due to pain)    Psych: awake, alert, cooperative    LABS AND OTHER PERTINENT TESTS:                          12.4   9.64  )-----------( 267      ( 22 Sep 2019 23:44 )             37.9     09-22    139  |  102  |  13  ----------------------------<  131<H>  3.8   |  23  |  0.7    Ca    9.7      22 Sep 2019 23:44  Phos  3.8     09-22  Mg     2.3     09-22    TPro  7.4  /  Alb  4.9  /  TBili  0.6  /  DBili  x   /  AST  30  /  ALT  21  /  AlkPhos  66  09-22    PT/INR - ( 22 Sep 2019 14:40 )   PT: 10.70 sec;   INR: 0.93 ratio         PTT - ( 22 Sep 2019 14:40 )  PTT:29.8 sec    < from: 12 Lead ECG (09.22.19 @ 14:35) >  Diagnosis Line Sinus rhythm with short FL  Otherwise normal ECG    < end of copied text >    < from: CT Abdomen and Pelvis w/ IV Cont (09.22.19 @ 18:37) >  IMPRESSION:     Large left hydropneumothorax in the setting of bullous disease, status   post chest tube placement.    Left subcapital femoral neck fracture.    Markedly distended urinary bladder, correlate for urinary retention.      < end of copied text >    < from: CT Cervical Spine No Cont (09.22.19 @ 18:36) >  IMPRESSION:    No acute fracture or significant subluxation of the cervical spine.    Partially imaged is a large left pneumothorax, status post chest tube   placement.      < end of copied text >    < from: CT Head No Cont (09.22.19 @ 18:36) >  Impression:      No mass effect or intracranial hemorrhage.     Chronic microvascular ischemic changes.    < end of copied text >    < from: Xray Femur 2 Views, Left (09.22.19 @ 14:59) >  Findings/  impression: Left femoral neck fracture in varus angulation. Remainder of   femur intact.      < end of copied text >      Risk Assessment: (Use One)    [employer] NSQIP Surgical Risk Calculator http://riskcalculator.facs.org/      Revised Cardiac Risk Index 0      ___x_____  The patient is optimized for surgery/procedure and may proceed to the operating room as scheduled    _________The patient is NOT optimized for surgery/procedure and should not proceed to the operating room as scheduled      Recommendations for optimization:    pain control  continue amlodipine  chest tube management per CT surgery  bowel regimen        Anticoagulation Recommendations:     continue heparin SQ perioperatively

## 2019-09-23 NOTE — PROGRESS NOTE ADULT - ASSESSMENT
Assessment:  67 y/o female s/p fall with left femoral neck fracture and large left pneumothorax, s/p left chest tube placement, good placement of tube on CT w/ evidence of bullous disease    Plan:  -OK from CT surgery standpoint for OR with Ortho  -CT to stay during OR  -CXR in PACU Assessment:  69 y/o female s/p fall with left femoral neck fracture and large left pneumothorax, s/p left chest tube placement, good placement of tube on CT w/ evidence of bullous disease    Plan:  -OK from CT surgery standpoint for OR with Ortho  -CT to stay during OR  -CXR in PACU  -d/w Dr Llamas, will further assess patient for need for thoracic intervention

## 2019-09-23 NOTE — CONSULT NOTE ADULT - SUBJECTIVE AND OBJECTIVE BOX
SICU Consultation Note  =====================================================  HPI:  67 y/o female with PMHx of HTN, HLD presents to ED s/p trip and fall today. -HT, -LOC, -AC. She is complain of left hip pain. Denies any chest pain, SOB, abdominal pain, neck pain, headache. (22 Sep 2019 20:03)    Findings upon initial workup were large L PTX and L femoral neck fx.   ED placed a chest tube prior to CT scan.  Full imaging w/u listed below.     Thoracic surgery commented the following preoperatively:    patient with large bullous disease.  there is a chest tube in place which I would prefer to remove.  however patient apparently going to OR with ortho today, so will chest tube in and on suction during perioperative period, remove after, likely tomorrow.  no evidence of air leak.  some pleural air now, which I think is post procedural.    from the standpoint of OR today:    -would perform ortho intervention under local/block/MAC if possible.  -if not possible consider LMA to avoid PPV  -if positive pressure ventilation needed (i.e. intubation), chest tube in place will protect from intrapleural air.  however, it should be noted that patients with large asymmetric bullae can occasional trap air.  this would be manifested by rising airway pressures, and progressive shift towards the right on intraoperative imaging.  the immediate treatment for this would be to isolate the lung with a bronchial blocker, which thoracic can assist with if needed.  overall I suspect the risk of this is low but see above for recommendations regarding the case to avoid this.       Pt is now s/p L total hip w/ ortho. No HD instability pre-, intra-, or post-op. Pt managed with epidural anesthesia and sedation, was not intubated for the case.    Surgery Information  OR time: 2hr    EBL:  400        IV Fluids: 1800      Blood Products: None    UOP: No sapp    Pt currently complaining of no pain, a&o x3.      PAST MEDICAL & SURGICAL HISTORY:  Osteoarthritis  High cholesterol  HTN (hypertension)    Home Meds: Home Medications:  AMLODIPINE BESYLATE 2.5 MG TABS:  (22 Sep 2019 20:06)  SIMVASTATIN 20 MG TABS:  (22 Sep 2019 20:06)    Allergies:     No Known Allergies    Soc:   Advanced Directives: Full Code     ROS:    REVIEW OF SYSTEMS    [x] A ten-point review of systems was otherwise negative except as noted.  [ ] Due to altered mental status/intubation, subjective information were not able to be obtained from the patient. History was obtained, to the extent possible, from review of the chart and collateral sources of information.      CURRENT MEDICATIONS:   --------------------------------------------------------------------------------------  Neurologic Medications  acetaminophen   Tablet .. 650 milliGRAM(s) Oral every 6 hours  meperidine     Injectable 12.5 milliGRAM(s) IV Push every 10 minutes PRN Shivering  morphine  - Injectable 2 milliGRAM(s) IV Push every 10 minutes PRN Severe Pain (7 - 10)  morphine  - Injectable 1 milliGRAM(s) IV Push every 10 minutes PRN Moderate Pain (4 - 6)  ondansetron Injectable 4 milliGRAM(s) IV Push every 6 hours PRN Nausea and/or Vomiting  traMADol 50 milliGRAM(s) Oral every 6 hours PRN Severe Pain (7 - 10)    Respiratory Medications    Cardiovascular Medications    Gastrointestinal Medications  aluminum hydroxide/magnesium hydroxide/simethicone Suspension 30 milliLiter(s) Oral four times a day PRN Indigestion  docusate sodium 100 milliGRAM(s) Oral three times a day  ferrous    sulfate 325 milliGRAM(s) Oral three times a day with meals  lactated ringers. 1000 milliLiter(s) IV Continuous <Continuous>  magnesium hydroxide Suspension 30 milliLiter(s) Oral daily PRN Constipation  multivitamin 1 Tablet(s) Oral daily  pantoprazole    Tablet 40 milliGRAM(s) Oral before breakfast  polyethylene glycol 3350 17 Gram(s) Oral daily  senna 2 Tablet(s) Oral at bedtime PRN Constipation  sodium chloride 0.9%. 1000 milliLiter(s) IV Continuous <Continuous>    Genitourinary Medications    Hematologic/Oncologic Medications    Antimicrobial/Immunologic Medications    Endocrine/Metabolic Medications  simvastatin 20 milliGRAM(s) Oral at bedtime    Topical/Other Medications  chlorhexidine 4% Liquid 1 Application(s) Topical <User Schedule>    --------------------------------------------------------------------------------------    VITAL SIGNS, INS/OUTS (last 24 hours):  --------------------------------------------------------------------------------------  ICU Vital Signs Last 24 Hrs  T(C): 35.9 (23 Sep 2019 20:57), Max: 36.9 (22 Sep 2019 23:46)  T(F): 96.7 (23 Sep 2019 20:57), Max: 98.5 (22 Sep 2019 23:46)  HR: 88 (23 Sep 2019 21:57) (64 - 100)  BP: 116/65 (23 Sep 2019 21:57) (116/65 - 170/79)  BP(mean): --  ABP: --  ABP(mean): --  RR: 18 (23 Sep 2019 21:57) (16 - 18)  SpO2: 100% (23 Sep 2019 21:57) (100% - 100%)    I&O's Summary    --------------------------------------------------------------------------------------    EXAM:  General/Neuro  Exam: Normal, NAD, alert, oriented x 3, no focal deficits. PERRLA  EOMI    Respiratory  Exam: Lungs clear to auscultation, Normal expansion/effort.    Satting well on NC. L CT in place, put back to suction, Zip ties applied by SICU team    Cardiovascular  Exam: S1, S2.  Regular rate and rhythm.  No edema  Cardiac Rhythm: Normal Sinus Rhythm    GI  Exam: Abdomen soft, Non-tender, Non-distended.       Tubes/Lines/Drains  ***  [x] Peripheral IV    Extremities  Exam: Extremities warm, pink, well-perfused.    LLE dressing c/d/i  Moving all extremities  Distal pulses palpable, 2/2 and strong in all 4 extremities    Derm:  Exam: Good skin turgor, no skin breakdown.      :   Exam: No sapp     LABS  --------------------------------------------------------------------------------------  Labs:  CAPILLARY BLOOD GLUCOSE      POCT Blood Glucose.: 117 mg/dL (23 Sep 2019 21:00)  POCT Blood Glucose.: 111 mg/dL (23 Sep 2019 19:56)  POCT Blood Glucose.: 94 mg/dL (23 Sep 2019 15:36)                          12.4   9.64  )-----------( 267      ( 22 Sep 2019 23:44 )             37.9       Auto Neutrophil %: 83.0 % (09-22-19 @ 23:44)  Auto Immature Granulocyte %: 0.5 % (09-22-19 @ 23:44)    09-22    139  |  102  |  13  ----------------------------<  131<H>  3.8   |  23  |  0.7      Calcium, Total Serum: 9.7 mg/dL (09-22-19 @ 23:44)      LFTs:             7.4  | 0.6  | 30       ------------------[66      ( 22 Sep 2019 14:40 )  4.9  | x    | 21          Lipase:x      Amylase:x           Coags:     10.70  ----< 0.93    ( 22 Sep 2019 14:40 )     29.8     --------------------------------------------------------------------------------------    OTHER LABS    IMAGING RESULTS    < from: CT Abdomen and Pelvis w/ IV Cont (09.22.19 @ 18:37) >  Large left hydropneumothorax in the setting of bullous disease, status   post chest tube placement.    Left subcapital femoral neck fracture.    Markedly distended urinary bladder, correlate for urinary retention.    < end of copied text >    < from: CT Cervical Spine No Cont (09.22.19 @ 18:36) >  No acute fracture or significant subluxation of the cervical spine.    Partially imaged is a large left pneumothorax, status post chest tube   placement.    < end of copied text >    < from: CT Head No Cont (09.22.19 @ 18:36) >  No mass effect or intracranial hemorrhage.     Chronic microvascular ischemic changes.    < end of copied text >    < from: Xray Hip 2-3 Views, Left (09.22.19 @ 13:20) >  Left femoral subcapital fracture.    < end of copied text >    < from: Xray Femur 2 Views, Left (09.22.19 @ 14:59) >  impression: Left femoral neck fracture in varus angulation. Remainder of   femur intact.    < end of copied text >    < from: Xray Chest 1 View- PORTABLE-Urgent (09.22.19 @ 15:00) >  Large left pneumothorax.    < end of copied text >

## 2019-09-23 NOTE — PROGRESS NOTE ADULT - SUBJECTIVE AND OBJECTIVE BOX
HPI:  69 y/o female with PMHx of HTN, HLD presents to ED s/p trip and fall today. -HT, -LOC, -AC. She is complain of left hip pain. Denies any chest pain, SOB, abdominal pain, neck pain, headache. (22 Sep 2019 20:03)      T(C): 36 (09-23-19 @ 07:59), Max: 36.9 (09-22-19 @ 23:46)  HR: 64 (09-23-19 @ 07:59) (64 - 104)  BP: 133/67 (09-23-19 @ 07:59) (116/61 - 170/79)  RR: 18 (09-23-19 @ 07:59) (18 - 18)  SpO2: 100% (09-23-19 @ 07:59) (96% - 100%)    MOTOR EXAM NOt Tested      Physical Medicine And Rehabilitation Services are not indicated in this patient for the following Reason(s):    [x    ] Patient is medically unstable AWAITING ORIF HIP FX      [    ]  Patient does not have appropriate activity orders      [     ] Patient has no weight bearing status for:      [     ] Patient is independently ambulating      [     ]  Patient is from Skilled Nursing Facility and is appropriate to return      [     ] Patient was non-ambulatory prior to admission      [     ]  Other      WILL CANCEL PM&R / PT request

## 2019-09-23 NOTE — PROGRESS NOTE ADULT - ASSESSMENT
68 year old female s/p mechanical fall with left subcapital femoral neck fracture and left pneumothorax on chest xray s/p chest tube in good placement on CT with evidence of bullous disease post-tube placement    PLAN:  -medical optimization/clearence for OR  -Pre op for ortho surgery- hip fracture today  -CT surgery for bullous disease  -pain control   -chest tube to suction  -pain control  -npo/iv  -pt/re/sw 68 year old female s/p mechanical fall with left subcapital femoral neck fracture and left pneumothorax on chest xray s/p chest tube in good placement on CT with evidence of bullous disease post-tube placement    PLAN:  -medical optimization/clearence for OR  -Pre op for ortho surgery - left femoral subcapital fracture, today  -CT surgery for bullous disease  -pain control   -chest tube to suction  -pain control  -npo/iv  -pt/re/sw

## 2019-09-23 NOTE — PRE-OP CHECKLIST - 1.
pt to pre op accompanied by son,on o2 2l nc ,pt has chest tube to left side clamped pt to pre op accompanied by son,on o2 2l nc ,pt has chest tube to left side clamped.no respiratory  distress noted

## 2019-09-23 NOTE — BRIEF OPERATIVE NOTE - OPERATION/FINDINGS
above noted fracture; post op- WBAT, anterior hip precautions; Abx and DVT ppx per protocol  Dict:  Implants: [employer] Actis size 5 standard offset stem; 51mm pinnacle cup; +1.5/36 mm Biolox head above noted fracture; post op- WBAT, anterior hip precautions; Abx and DVT ppx per protocol  Dict:[id number]  Implants: [employer] Actis size 5 standard offset stem; 51mm pinnacle cup; 1 x 6.5 mm screw; unhooded liner; +1.5/36 mm Biolox head

## 2019-09-23 NOTE — CONSULT NOTE ADULT - ASSESSMENT
ASSESSMENT:  68y Female s/p mech fall w/ L ptx s/p ct and L femur fx s/p total hip    PLAN:     Neurologic:   A&O x3  Epidural anesthesia  Tramadol, toradol, tylenol, morphine, demerol, celebrex ordered for pain  Recommend re-evaluating pain control and polypharmacy when anesthesia orders fall off, and epidural wears off    Respiratory:   Sat 99% on NC  L CT in place, to suction, zip ties applied by SICU team    Cardiovascular:   RRR  HTN on Amlodipine  HLD on simvastatin    Gastrointestinal/Nutrition:   Colace, senna, miralax bowel regimen  Zofran  PPI  Multivitamin   Reg diet  D/c IVF once tolerating    Renal/Genitourinary:   No sapp   F/u TOV    Hematologic:   Hb 10 from 12. F/u AM  LVX ppx for 1 mo as per ortho    Infectious Disease:   24h ancef periop  No dx    Lines/Tubes:  L CT to suction  PIVs    Endocrine:   No dx    Disposition: Back to floor    --------------------------------------------------------------------------------------    Critical Care Diagnoses: None at present    D/w Dr. Santana

## 2019-09-23 NOTE — PHYSICAL THERAPY INITIAL EVALUATION ADULT - SPECIFY REASON(S)
Chart reviewed. Pt. currently without activity orders. WB status noted. PT evaluation on hold pending OOB orders as appropriate. Will follow.

## 2019-09-23 NOTE — CHART NOTE - NSCHARTNOTEFT_GEN_A_CORE
Preop Dx: Left femoral subcapital fracture  Surgeon: Orthopedic surgery      Vital Signs Last 24 Hrs  T(C): 36.9 (22 Sep 2019 23:46), Max: 36.9 (22 Sep 2019 23:46)  T(F): 98.5 (22 Sep 2019 23:46), Max: 98.5 (22 Sep 2019 23:46)  HR: 84 (22 Sep 2019 23:46) (81 - 104)  BP: 170/79 (22 Sep 2019 23:46) (116/61 - 170/79)  BP(mean): --  RR: 18 (22 Sep 2019 23:46) (18 - 18)  SpO2: 100% (22 Sep 2019 23:46) (96% - 100%)                        13.4   13.09 )-----------( 279      ( 22 Sep 2019 14:40 )             39.5     09-22    144  |  105  |  14  ----------------------------<  102<H>  4.1   |  24  |  0.8    Ca    10.0      22 Sep 2019 14:40    TPro  7.4  /  Alb  4.9  /  TBili  0.6  /  DBili  x   /  AST  30  /  ALT  21  /  AlkPhos  66  09-22    PT/INR - ( 22 Sep 2019 14:40 )   PT: 10.70 sec;   INR: 0.93 ratio         PTT - ( 22 Sep 2019 14:40 )  PTT:29.8 sec  Daily Height in cm: 160.02 (22 Sep 2019 12:15)    Daily     EKG: Complete  CXR: Complete  Type and Screen: Ordered 9/22 @ 23:30    A/P: 68y Female     - OR 9/23/19 for repair of left femoral subcapital fracture with orthopedic surgery  - NPO past midnight, except medications  - IVF while NPO  - Consent to be signed  - Medical clearance for OR

## 2019-09-23 NOTE — PROGRESS NOTE ADULT - SUBJECTIVE AND OBJECTIVE BOX
GENERAL SURGERY PROGRESS NOTE     ELLIOTANDIA  68y  Female  Hospital day: 2d    OVERNIGHT EVENTS: no acute events overnight    T(F): 96.8 (09-23-19 @ 07:59), Max: 98.5 (09-22-19 @ 23:46)  HR: 64 (09-23-19 @ 07:59) (64 - 104)  BP: 133/67 (09-23-19 @ 07:59) (116/61 - 170/79)  RR: 18 (09-23-19 @ 07:59) (18 - 18)  SpO2: 100% (09-23-19 @ 07:59) (96% - 100%)    DIET/FLUIDS: lactated ringers. 1000 milliLiter(s) IV Continuous <Continuous>  sodium chloride 0.9%. 1000 milliLiter(s) IV Continuous <Continuous>    GI proph:  pantoprazole    Tablet 40 milliGRAM(s) Oral before breakfast    AC/ proph: heparin  Injectable 5000 Unit(s) SubCutaneous every 8 hours    PHYSICAL EXAM:  GENERAL: NAD, well-appearing  CHEST/LUNG: absent breath sounds on L, clear to auscultation on R  HEART: Regular rate and rhythm  ABDOMEN: Soft, Nontender, Nondistended;   EXTREMITIES:  No clubbing, cyanosis, or edema, tender to palpation in L hip    Labs:               12.4   9.64  )-----------( 267      ( 22 Sep 2019 23:44 )             37.9       Auto Neutrophil %: 83.0 % (09-22-19 @ 23:44)  Auto Immature Granulocyte %: 0.5 % (09-22-19 @ 23:44)  Auto Neutrophil %: 88.2 % (09-22-19 @ 14:40)  Auto Immature Granulocyte %: 0.4 % (09-22-19 @ 14:40)    09-22    139  |  102  |  13  ----------------------------<  131<H>  3.8   |  23  |  0.7    Calcium, Total Serum: 9.7 mg/dL (09-22-19 @ 23:44)    LFTs:             7.4  | 0.6  | 30       ------------------[66      ( 22 Sep 2019 14:40 )  4.9  | x    | 21          Coags:     10.70  ----< 0.93    ( 22 Sep 2019 14:40 )     29.8

## 2019-09-23 NOTE — PROGRESS NOTE ADULT - SUBJECTIVE AND OBJECTIVE BOX
ORTHO POST OP NOTE    Procedure: Left total hip arthroplasty    Patient seen and examined at bedside. No acute events since OR. Resting without complaints. Pain controlled with medication. Denies chest pain, SOB, N/V.    T(C): 35.9 (09-23-19 @ 20:57), Max: 36.9 (09-22-19 @ 23:46)  HR: 88 (09-23-19 @ 21:57) (64 - 100)  BP: 116/65 (09-23-19 @ 21:57) (116/65 - 170/79)  RR: 18 (09-23-19 @ 21:57) (16 - 18)  SpO2: 100% (09-23-19 @ 21:57) (100% - 100%)  Wt(kg): --    Exam:  Alert and Cotton, No Acute Distress  Breathing comfortably on 2L NC  Chest tube in place    LLE  Dressing  C/D/I  Compartments soft/compressible  Calves soft  EHL/FHL Motor intact  SILT distally  Ext wwp                           10.6<L>  10.10 )-----------( 228      ( 23 Sep 2019 21:13 )             32.9<L>     09-22    139  |  102  |  13  ----------------------------<  131<H>  3.8   |  23  |  0.7      PT/INR - ( 22 Sep 2019 14:40 )   PT: 10.70 sec;   INR: 0.93 ratio         PTT - ( 22 Sep 2019 14:40 )  PTT:29.8 sec      A/P: 68yF S/p Left total hip arthroplasty    -Periop antibiotics  -PT/OT  -WBAT  -OOBTC  -F/U AM Labs  -DVT PPx  -Pain Control  -SCDs  -IS  -Chest tube management per trauma  -Dispo planning

## 2019-09-23 NOTE — BRIEF OPERATIVE NOTE - NSICDXBRIEFPROCEDURE_GEN_ALL_CORE_FT
PROCEDURES:  Left total hip arthroplasty 23-Sep-2019 20:40:43 via direct anterior; CPT code 7130 Alistair Collins PROCEDURES:  Left total hip arthroplasty 23-Sep-2019 20:40:43 via direct anterior; CPT code 19139 Alistair Collins

## 2019-09-23 NOTE — CONSULT NOTE ADULT - ATTENDING COMMENTS
I personally examined the patient with the PA and resident and discussed my plan with them    this patient had an uneventful operative course and post-operatively is hemodynamically stable.  she does not require critical care admission at this time
69 yo female s/p fall on left side with hip fracture and what appeared to be a large pneumothorax with cardiac shift to the right. Chest tube was placed and ct scan obtained. It turned out patient has a large bleb disease. Thoracic surgery consult and orthopedic consult.   cont chest tube for now.
69 y/o female with PMHx of HTN, HLD, osteoarthritis, presents to ED s/p trip and fall today. -HT, -LOC, -AC. She is complaining of left hip pain. Denies any chest pain, SOB, abdominal pain, neck pain, headache.   CXR revealed complete collapse of the left lung  patient asymptomatic from pulmonary standpoint  CT is placed by trauma team with no airleak  Patient underwent CT chest noncontrast that revealed significant left bullous disease with residual PTX suggestive long standing process  management as per trauma team  will follow

## 2019-09-24 ENCOUNTER — TRANSCRIPTION ENCOUNTER (OUTPATIENT)
Age: 69
End: 2019-09-24

## 2019-09-24 LAB
ANION GAP SERPL CALC-SCNC: 11 MMOL/L — SIGNIFICANT CHANGE UP (ref 7–14)
ANION GAP SERPL CALC-SCNC: 13 MMOL/L — SIGNIFICANT CHANGE UP (ref 7–14)
ANION GAP SERPL CALC-SCNC: 16 MMOL/L — HIGH (ref 7–14)
BASOPHILS # BLD AUTO: 0.03 K/UL — SIGNIFICANT CHANGE UP (ref 0–0.2)
BASOPHILS NFR BLD AUTO: 0.3 % — SIGNIFICANT CHANGE UP (ref 0–1)
BUN SERPL-MCNC: 13 MG/DL — SIGNIFICANT CHANGE UP (ref 10–20)
BUN SERPL-MCNC: 8 MG/DL — LOW (ref 10–20)
BUN SERPL-MCNC: 9 MG/DL — LOW (ref 10–20)
CALCIUM SERPL-MCNC: 8.5 MG/DL — SIGNIFICANT CHANGE UP (ref 8.5–10.1)
CALCIUM SERPL-MCNC: 8.8 MG/DL — SIGNIFICANT CHANGE UP (ref 8.5–10.1)
CALCIUM SERPL-MCNC: 9 MG/DL — SIGNIFICANT CHANGE UP (ref 8.5–10.1)
CHLORIDE SERPL-SCNC: 103 MMOL/L — SIGNIFICANT CHANGE UP (ref 98–110)
CHLORIDE SERPL-SCNC: 105 MMOL/L — SIGNIFICANT CHANGE UP (ref 98–110)
CHLORIDE SERPL-SCNC: 107 MMOL/L — SIGNIFICANT CHANGE UP (ref 98–110)
CO2 SERPL-SCNC: 21 MMOL/L — SIGNIFICANT CHANGE UP (ref 17–32)
CO2 SERPL-SCNC: 22 MMOL/L — SIGNIFICANT CHANGE UP (ref 17–32)
CO2 SERPL-SCNC: 25 MMOL/L — SIGNIFICANT CHANGE UP (ref 17–32)
CREAT SERPL-MCNC: 0.5 MG/DL — LOW (ref 0.7–1.5)
CREAT SERPL-MCNC: 0.6 MG/DL — LOW (ref 0.7–1.5)
CREAT SERPL-MCNC: 0.7 MG/DL — SIGNIFICANT CHANGE UP (ref 0.7–1.5)
EOSINOPHIL # BLD AUTO: 0.1 K/UL — SIGNIFICANT CHANGE UP (ref 0–0.7)
EOSINOPHIL NFR BLD AUTO: 1 % — SIGNIFICANT CHANGE UP (ref 0–8)
GLUCOSE SERPL-MCNC: 102 MG/DL — HIGH (ref 70–99)
GLUCOSE SERPL-MCNC: 117 MG/DL — HIGH (ref 70–99)
GLUCOSE SERPL-MCNC: 215 MG/DL — HIGH (ref 70–99)
HCT VFR BLD CALC: 29.1 % — LOW (ref 37–47)
HCT VFR BLD CALC: 32.2 % — LOW (ref 37–47)
HCT VFR BLD CALC: 32.9 % — LOW (ref 37–47)
HCV AB S/CO SERPL IA: 0.07 S/CO — SIGNIFICANT CHANGE UP (ref 0–0.99)
HCV AB SERPL-IMP: SIGNIFICANT CHANGE UP
HGB BLD-MCNC: 10.5 G/DL — LOW (ref 12–16)
HGB BLD-MCNC: 10.8 G/DL — LOW (ref 12–16)
HGB BLD-MCNC: 9.7 G/DL — LOW (ref 12–16)
IMM GRANULOCYTES NFR BLD AUTO: 0.3 % — SIGNIFICANT CHANGE UP (ref 0.1–0.3)
LYMPHOCYTES # BLD AUTO: 0.89 K/UL — LOW (ref 1.2–3.4)
LYMPHOCYTES # BLD AUTO: 8.9 % — LOW (ref 20.5–51.1)
MAGNESIUM SERPL-MCNC: 1.9 MG/DL — SIGNIFICANT CHANGE UP (ref 1.8–2.4)
MCHC RBC-ENTMCNC: 29.7 PG — SIGNIFICANT CHANGE UP (ref 27–31)
MCHC RBC-ENTMCNC: 30 PG — SIGNIFICANT CHANGE UP (ref 27–31)
MCHC RBC-ENTMCNC: 30.1 PG — SIGNIFICANT CHANGE UP (ref 27–31)
MCHC RBC-ENTMCNC: 32.6 G/DL — SIGNIFICANT CHANGE UP (ref 32–37)
MCHC RBC-ENTMCNC: 32.8 G/DL — SIGNIFICANT CHANGE UP (ref 32–37)
MCHC RBC-ENTMCNC: 33.3 G/DL — SIGNIFICANT CHANGE UP (ref 32–37)
MCV RBC AUTO: 90.4 FL — SIGNIFICANT CHANGE UP (ref 81–99)
MCV RBC AUTO: 91.2 FL — SIGNIFICANT CHANGE UP (ref 81–99)
MCV RBC AUTO: 91.4 FL — SIGNIFICANT CHANGE UP (ref 81–99)
MONOCYTES # BLD AUTO: 0.69 K/UL — HIGH (ref 0.1–0.6)
MONOCYTES NFR BLD AUTO: 6.9 % — SIGNIFICANT CHANGE UP (ref 1.7–9.3)
NEUTROPHILS # BLD AUTO: 8.23 K/UL — HIGH (ref 1.4–6.5)
NEUTROPHILS NFR BLD AUTO: 82.6 % — HIGH (ref 42.2–75.2)
NRBC # BLD: 0 /100 WBCS — SIGNIFICANT CHANGE UP (ref 0–0)
PHOSPHATE SERPL-MCNC: 1.4 MG/DL — LOW (ref 2.1–4.9)
PLATELET # BLD AUTO: 189 K/UL — SIGNIFICANT CHANGE UP (ref 130–400)
PLATELET # BLD AUTO: 194 K/UL — SIGNIFICANT CHANGE UP (ref 130–400)
PLATELET # BLD AUTO: 204 K/UL — SIGNIFICANT CHANGE UP (ref 130–400)
POTASSIUM SERPL-MCNC: 3.6 MMOL/L — SIGNIFICANT CHANGE UP (ref 3.5–5)
POTASSIUM SERPL-MCNC: 4.4 MMOL/L — SIGNIFICANT CHANGE UP (ref 3.5–5)
POTASSIUM SERPL-MCNC: 4.5 MMOL/L — SIGNIFICANT CHANGE UP (ref 3.5–5)
POTASSIUM SERPL-SCNC: 3.6 MMOL/L — SIGNIFICANT CHANGE UP (ref 3.5–5)
POTASSIUM SERPL-SCNC: 4.4 MMOL/L — SIGNIFICANT CHANGE UP (ref 3.5–5)
POTASSIUM SERPL-SCNC: 4.5 MMOL/L — SIGNIFICANT CHANGE UP (ref 3.5–5)
RBC # BLD: 3.22 M/UL — LOW (ref 4.2–5.4)
RBC # BLD: 3.53 M/UL — LOW (ref 4.2–5.4)
RBC # BLD: 3.6 M/UL — LOW (ref 4.2–5.4)
RBC # FLD: 13 % — SIGNIFICANT CHANGE UP (ref 11.5–14.5)
SODIUM SERPL-SCNC: 138 MMOL/L — SIGNIFICANT CHANGE UP (ref 135–146)
SODIUM SERPL-SCNC: 142 MMOL/L — SIGNIFICANT CHANGE UP (ref 135–146)
SODIUM SERPL-SCNC: 143 MMOL/L — SIGNIFICANT CHANGE UP (ref 135–146)
WBC # BLD: 9.24 K/UL — SIGNIFICANT CHANGE UP (ref 4.8–10.8)
WBC # BLD: 9.58 K/UL — SIGNIFICANT CHANGE UP (ref 4.8–10.8)
WBC # BLD: 9.97 K/UL — SIGNIFICANT CHANGE UP (ref 4.8–10.8)
WBC # FLD AUTO: 9.24 K/UL — SIGNIFICANT CHANGE UP (ref 4.8–10.8)
WBC # FLD AUTO: 9.58 K/UL — SIGNIFICANT CHANGE UP (ref 4.8–10.8)
WBC # FLD AUTO: 9.97 K/UL — SIGNIFICANT CHANGE UP (ref 4.8–10.8)

## 2019-09-24 PROCEDURE — 71045 X-RAY EXAM CHEST 1 VIEW: CPT | Mod: 26

## 2019-09-24 PROCEDURE — 99232 SBSQ HOSP IP/OBS MODERATE 35: CPT

## 2019-09-24 RX ORDER — POTASSIUM PHOSPHATE, MONOBASIC POTASSIUM PHOSPHATE, DIBASIC 236; 224 MG/ML; MG/ML
30 INJECTION, SOLUTION INTRAVENOUS ONCE
Refills: 0 | Status: COMPLETED | OUTPATIENT
Start: 2019-09-24 | End: 2019-09-24

## 2019-09-24 RX ADMIN — Medication 100 MILLIGRAM(S): at 05:36

## 2019-09-24 RX ADMIN — Medication 15 MILLIGRAM(S): at 05:37

## 2019-09-24 RX ADMIN — Medication 650 MILLIGRAM(S): at 12:09

## 2019-09-24 RX ADMIN — Medication 15 MILLIGRAM(S): at 21:23

## 2019-09-24 RX ADMIN — Medication 325 MILLIGRAM(S): at 12:10

## 2019-09-24 RX ADMIN — Medication 100 MILLIGRAM(S): at 02:49

## 2019-09-24 RX ADMIN — ENOXAPARIN SODIUM 40 MILLIGRAM(S): 100 INJECTION SUBCUTANEOUS at 13:22

## 2019-09-24 RX ADMIN — Medication 650 MILLIGRAM(S): at 18:49

## 2019-09-24 RX ADMIN — PANTOPRAZOLE SODIUM 40 MILLIGRAM(S): 20 TABLET, DELAYED RELEASE ORAL at 05:36

## 2019-09-24 RX ADMIN — AMLODIPINE BESYLATE 2.5 MILLIGRAM(S): 2.5 TABLET ORAL at 05:36

## 2019-09-24 RX ADMIN — Medication 100 MILLIGRAM(S): at 21:17

## 2019-09-24 RX ADMIN — Medication 650 MILLIGRAM(S): at 05:37

## 2019-09-24 RX ADMIN — SIMVASTATIN 20 MILLIGRAM(S): 20 TABLET, FILM COATED ORAL at 21:17

## 2019-09-24 RX ADMIN — Medication 1 TABLET(S): at 12:10

## 2019-09-24 RX ADMIN — Medication 100 MILLIGRAM(S): at 13:22

## 2019-09-24 RX ADMIN — Medication 15 MILLIGRAM(S): at 13:24

## 2019-09-24 RX ADMIN — Medication 15 MILLIGRAM(S): at 21:19

## 2019-09-24 RX ADMIN — Medication 325 MILLIGRAM(S): at 08:51

## 2019-09-24 RX ADMIN — Medication 650 MILLIGRAM(S): at 17:31

## 2019-09-24 RX ADMIN — Medication 325 MILLIGRAM(S): at 17:31

## 2019-09-24 RX ADMIN — Medication 650 MILLIGRAM(S): at 13:16

## 2019-09-24 RX ADMIN — POLYETHYLENE GLYCOL 3350 17 GRAM(S): 17 POWDER, FOR SOLUTION ORAL at 12:09

## 2019-09-24 NOTE — PROGRESS NOTE ADULT - SUBJECTIVE AND OBJECTIVE BOX
GENERAL SURGERY PROGRESS NOTE     SHOSHANA ZARATE  Female  Hospital day: 3d  POD: 1d  Procedure: Left total hip arthroplasty    OVERNIGHT EVENTS: no acute events overnight    T(F): 98.2 (09-24-19 @ 08:12), Max: 98.8 (09-23-19 @ 22:30)  HR: 99 (09-24-19 @ 08:12) (84 - 100)  BP: 134/73 (09-24-19 @ 08:12) (116/65 - 161/79)  RR: 18 (09-24-19 @ 08:12) (16 - 19)  SpO2: 98% (09-23-19 @ 23:55) (98% - 100%)    DIET/FLUIDS: ferrous    sulfate 325 milliGRAM(s) Oral three times a day with meals  lactated ringers. 1000 milliLiter(s) IV Continuous <Continuous>  multivitamin 1 Tablet(s) Oral daily  sodium chloride 0.9%. 1000 milliLiter(s) IV Continuous <Continuous>    GI proph:  pantoprazole    Tablet 40 milliGRAM(s) Oral before breakfast    ABx: ceFAZolin   IVPB 1000 milliGRAM(s) IV Intermittent every 8 hours    PHYSICAL EXAM:  GENERAL: NAD, well-appearing  CHEST/LUNG: absent breath sounds on L, clear to auscultation on R, chest tube in place dressing clean, dry and intact  HEART: Regular rate and rhythm  ABDOMEN: Soft, Nontender, Nondistended;   EXTREMITIES:  No clubbing, cyanosis, or edema    Labs:  CAPILLARY BLOOD GLUCOSE  POCT Blood Glucose.: 117 mg/dL (23 Sep 2019 21:00)  POCT Blood Glucose.: 111 mg/dL (23 Sep 2019 19:56)  POCT Blood Glucose.: 94 mg/dL (23 Sep 2019 15:36)                        10.8   9.24  )-----------( 194      ( 24 Sep 2019 04:39 )             32.9       09-24    143  |  107  |  8<L>  ----------------------------<  102<H>  4.4   |  25  |  0.5<L>    Calcium, Total Serum: 8.8 mg/dL (09-24-19 @ 04:39)    LFTs:             7.4  | 0.6  | 30       ------------------[66      ( 22 Sep 2019 14:40 )  4.9  | x    | 21          Coags:     10.70  ----< 0.93    ( 22 Sep 2019 14:40 )     29.8      CARDIAC MARKERS ( 23 Sep 2019 21:06 )  x     / <0.01 ng/mL / 789 U/L / x     / 6.1 ng/mL

## 2019-09-24 NOTE — PROGRESS NOTE ADULT - ASSESSMENT
Assessment:  69 y/o female s/p fall with left femoral neck fracture and large left pneumothorax, s/p left chest tube placement, good placement of tube on CT w/ evidence of bullous disease    Plan:  -can d/c CT today  -CXR 4 hours after d/c CT  -will discuss plans for further intervention with Dr. Llamas

## 2019-09-24 NOTE — DIETITIAN INITIAL EVALUATION ADULT. - FEEDING SKILL
Pt with great appetite, currently consuming 75% of meal trays as she states that we provide too much food on our meal trays./independent

## 2019-09-24 NOTE — PROGRESS NOTE ADULT - SUBJECTIVE AND OBJECTIVE BOX
GENERAL SURGERY PROGRESS NOTE     SHOSHANA ZARATE  Female  Hospital day :2d  POD: 1  Procedure: Left total hip arthroplasty    OVERNIGHT EVENTS: No acute events    T(F): 97 (09-24-19 @ 00:00), Max: 98.8 (09-23-19 @ 22:30)  HR: 85 (09-24-19 @ 00:00) (64 - 100)  BP: 142/67 (09-24-19 @ 00:00) (116/65 - 161/79)  RR: 18 (09-24-19 @ 00:00) (16 - 19)  SpO2: 98% (09-23-19 @ 23:55) (98% - 100%)    DIET/FLUIDS: ferrous    sulfate 325 milliGRAM(s) Oral three times a day with meals  lactated ringers. 1000 milliLiter(s) IV Continuous <Continuous>  multivitamin 1 Tablet(s) Oral daily  sodium chloride 0.9%. 1000 milliLiter(s) IV Continuous <Continuous>         GI proph:  pantoprazole    Tablet 40 milliGRAM(s) Oral before breakfast    AC/ proph:   ABx: ceFAZolin   IVPB 1000 milliGRAM(s) IV Intermittent every 8 hours      PHYSICAL EXAM:  GENERAL: NAD, well-appearing  CHEST/LUNG: Clear to auscultation bilaterally. L CT in place, put back to suction  HEART: Regular rate and rhythm  ABDOMEN: Soft, Nontender, Nondistended;   EXTREMITIES:  No clubbing, cyanosis, or edema. LLE dressing c/d/i      LABS  Labs:  CAPILLARY BLOOD GLUCOSE      POCT Blood Glucose.: 117 mg/dL (23 Sep 2019 21:00)  POCT Blood Glucose.: 111 mg/dL (23 Sep 2019 19:56)  POCT Blood Glucose.: 94 mg/dL (23 Sep 2019 15:36)                          10.5   9.58  )-----------( 204      ( 23 Sep 2019 23:50 )             32.2         09-23    142  |  105  |  9<L>  ----------------------------<  117<H>  4.5   |  21  |  0.6<L>      Calcium, Total Serum: 9.0 mg/dL (09-23-19 @ 21:13)      LFTs:             7.4  | 0.6  | 30       ------------------[66      ( 22 Sep 2019 14:40 )  4.9  | x    | 21          Lipase:x      Amylase:x             Coags:     10.70  ----< 0.93    ( 22 Sep 2019 14:40 )     29.8        CARDIAC MARKERS ( 23 Sep 2019 21:06 )  x     / <0.01 ng/mL / 789 U/L / x     / 6.1 ng/mL                  RADIOLOGY & ADDITIONAL TESTS:      A/P

## 2019-09-24 NOTE — PROGRESS NOTE ADULT - SUBJECTIVE AND OBJECTIVE BOX
ORTHO PROGRESS NOTE       68yFemale POD # 1     S/P left Total Hip Arthroplasty for femoral neck fracture     Patient seen and examined at bedside . The patient is awake and alert in NAD. No complaints of chest pain, SOB, N/V.    Vital Signs Last 24 Hrs  T(C): 37 (24 Sep 2019 04:00), Max: 37.1 (23 Sep 2019 22:30)  T(F): 98.6 (24 Sep 2019 04:00), Max: 98.8 (23 Sep 2019 22:30)  HR: 95 (24 Sep 2019 04:00) (64 - 100)  BP: 140/67 (24 Sep 2019 04:00) (116/65 - 161/79)  BP(mean): --  RR: 18 (24 Sep 2019 04:00) (16 - 19)  SpO2: 98% (23 Sep 2019 23:55) (98% - 100%)                          10.8   9.24  )-----------( 194      ( 24 Sep 2019 04:39 )             32.9     09-24    143  |  107  |  8<L>  ----------------------------<  102<H>  4.4   |  25  |  0.5<L>    Ca    8.8      24 Sep 2019 04:39  Phos  3.8     09-22  Mg     2.3     09-22    TPro  7.4  /  Alb  4.9  /  TBili  0.6  /  DBili  x   /  AST  30  /  ALT  21  /  AlkPhos  66  09-22    PT/INR - ( 22 Sep 2019 14:40 )   PT: 10.70 sec;   INR: 0.93 ratio         PTT - ( 22 Sep 2019 14:40 )  PTT:29.8 sec      DVT ppx : lovenox     MEDICATIONS  (STANDING):  acetaminophen   Tablet .. 650 milliGRAM(s) Oral every 6 hours  amLODIPine   Tablet 2.5 milliGRAM(s) Oral daily  ceFAZolin   IVPB 1000 milliGRAM(s) IV Intermittent every 8 hours  chlorhexidine 4% Liquid 1 Application(s) Topical <User Schedule>  docusate sodium 100 milliGRAM(s) Oral three times a day  enoxaparin Injectable 40 milliGRAM(s) SubCutaneous daily  ferrous    sulfate 325 milliGRAM(s) Oral three times a day with meals  ketorolac   Injectable 15 milliGRAM(s) IV Push every 8 hours  lactated ringers. 1000 milliLiter(s) (100 mL/Hr) IV Continuous <Continuous>  multivitamin 1 Tablet(s) Oral daily  pantoprazole    Tablet 40 milliGRAM(s) Oral before breakfast  polyethylene glycol 3350 17 Gram(s) Oral daily  simvastatin 20 milliGRAM(s) Oral at bedtime  sodium chloride 0.9%. 1000 milliLiter(s) (60 mL/Hr) IV Continuous <Continuous>    MEDICATIONS  (PRN):  aluminum hydroxide/magnesium hydroxide/simethicone Suspension 30 milliLiter(s) Oral four times a day PRN Indigestion  magnesium hydroxide Suspension 30 milliLiter(s) Oral daily PRN Constipation  ondansetron Injectable 4 milliGRAM(s) IV Push every 6 hours PRN Nausea and/or Vomiting  senna 2 Tablet(s) Oral at bedtime PRN Constipation  traMADol 50 milliGRAM(s) Oral every 6 hours PRN Severe Pain (7 - 10)      PE:    left hip dressing C/D/I          Compartments soft         NVI, SILT           A/P: 68yFemale POD # 1   S/P   left Total Hip Arthroplasty for femoral neck fracture            OOB to Chair            Physical Therapy           Pain control            Incentive Spirometry            DVT Prophylaxis            Discharge planning

## 2019-09-24 NOTE — CHART NOTE - NSCHARTNOTEFT_GEN_A_CORE
Upon Nutritional Assessment by the Registered Dietitian your patient was determined to meet criteria / has evidence of the following diagnosis/diagnoses:          [ ]  Mild Protein Calorie Malnutrition        [ ]  Moderate Protein Calorie Malnutrition        [ ] Severe Protein Calorie Malnutrition        [ ] Unspecified Protein Calorie Malnutrition        [x] Underweight / BMI <19        [ ] Morbid Obesity / BMI > 40    Underweight Indicator:  BMI 16.3     Findings as based on:  •  Comprehensive nutrition assessment and consultation  •  Nutrition history provided by the patient     Treatment:    The following diet has been recommended:      PROVIDER Section:     By signing this assessment you are acknowledging and agree with the diagnosis/diagnoses assigned by the Registered Dietitian    Comments:

## 2019-09-24 NOTE — PROGRESS NOTE ADULT - ASSESSMENT
68y Female s/p mech fall w/ L ptx s/p ct and L femur fx s/p total hip    PLAN:    -SICU management  -pain control   -chest tube to suction  -pain control  -Diet Regular  -pt/re/sw 68y Female s/p mech fall w/ L ptx s/p ct and L femur fx s/p total hip. Consulted SICU for ICU care    PLAN:    -SICU recommendations  -pain control   -chest tube to suction  -pain control  -Diet Regular  -pt/re/sw

## 2019-09-24 NOTE — CONSULT NOTE ADULT - SUBJECTIVE AND OBJECTIVE BOX
Patient is a 68y old  Female who presents with a chief complaint of hip fx (23 Sep 2019 10:06)    HPI:  67 y/o female with PMHx of HTN, HLD presents to ED s/p trip and fall today. -HT, -LOC, -AC. She is complain of left hip pain. Denies any chest pain, SOB, abdominal pain, neck pain, headache. (22 Sep 2019 20:03)      PAST MEDICAL & SURGICAL HISTORY:  Osteoarthritis  High cholesterol  HTN (hypertension)      Hospital Course: trauma [facility]-left femoral neck fracture and large left pneumothorax, s/p left chest tube placement, good placement of tube on CT w/ evidence of bullous disease  CT Dc 1 PM  SP L THR on 9/23    TODAY'S SUBJECTIVE & REVIEW OF SYMPTOMS:     Constitutional WNL   Cardio WNL   Resp WNL   GI WNL  Heme WNL  Endo WNL  Skin WNL  MSK L hip pain  Neuro WNL  Cognitive WNL  Psych WNL      MEDICATIONS  (STANDING):  acetaminophen   Tablet .. 650 milliGRAM(s) Oral every 6 hours  amLODIPine   Tablet 2.5 milliGRAM(s) Oral daily  chlorhexidine 4% Liquid 1 Application(s) Topical <User Schedule>  docusate sodium 100 milliGRAM(s) Oral three times a day  enoxaparin Injectable 40 milliGRAM(s) SubCutaneous daily  ferrous    sulfate 325 milliGRAM(s) Oral three times a day with meals  ketorolac   Injectable 15 milliGRAM(s) IV Push every 8 hours  multivitamin 1 Tablet(s) Oral daily  pantoprazole    Tablet 40 milliGRAM(s) Oral before breakfast  polyethylene glycol 3350 17 Gram(s) Oral daily  simvastatin 20 milliGRAM(s) Oral at bedtime    MEDICATIONS  (PRN):  aluminum hydroxide/magnesium hydroxide/simethicone Suspension 30 milliLiter(s) Oral four times a day PRN Indigestion  magnesium hydroxide Suspension 30 milliLiter(s) Oral daily PRN Constipation  ondansetron Injectable 4 milliGRAM(s) IV Push every 6 hours PRN Nausea and/or Vomiting  senna 2 Tablet(s) Oral at bedtime PRN Constipation  traMADol 50 milliGRAM(s) Oral every 6 hours PRN Severe Pain (7 - 10)      FAMILY HISTORY:      Allergies    No Known Allergies    Intolerances        SOCIAL HISTORY:    [    ] Etoh  [    ] Smoking  [    ] Substance abuse     Home Environment:  [    ] Home Alone  [    ] Lives with Family  [    ] Home Health Aid    Dwelling:  [ x   ] Apartment  [    ] Private House  [    ] Adult Home  [    ] Skilled Nursing Facility      [    ] Short Term  [    ] Long Term  [    ] Stairs                           [ x   ] Elevator     FUNCTIONAL STATUS PTA: (Check all that apply)  Ambulation: [   x  ]Independent    [    ] Dependent     [    ] Non-Ambulatory  Assistive Device: [    ] SA Cane  [    ]  Q Cane  [    ] Walker  [    ]  Wheelchair  ADL : [x    ] Independent  [    ]  Dependent       Vital Signs Last 24 Hrs  T(C): 36.8 (24 Sep 2019 08:12), Max: 37.1 (23 Sep 2019 22:30)  T(F): 98.2 (24 Sep 2019 08:12), Max: 98.8 (23 Sep 2019 22:30)  HR: 99 (24 Sep 2019 08:12) (84 - 100)  BP: 134/73 (24 Sep 2019 08:12) (116/65 - 161/79)  BP(mean): --  RR: 18 (24 Sep 2019 08:12) (16 - 19)  SpO2: 98% (23 Sep 2019 23:55) (98% - 100%)      PHYSICAL EXAM: Alert & Oriented X3  GENERAL: NAD, well-groomed, well-developed  HEAD:  Atraumatic, Normocephalic  EYES: EOMI, PERRLA, conjunctiva and sclera clear  NECK: Supple  CHEST/LUNG: diminished BS L  HEART: Regular rate and rhythm  ABDOMEN: Soft, Nontender, Nondistended; Bowel sounds present+ suprapubic fullness with primafit  EXTREMITIES:  no calf tenderness,no edema BLES, L hip bandaged    NERVOUS SYSTEM:  Cranial Nerves 2-12 intact [  x  ] Abnormal  [    ]  ROM: WFL all extremities [  x  ]  Abnormal [     ]  Motor Strength: WFL all extremities  [    ]  Abnormal [x    ]3-/5 L hip  Sensation: intact to light touch [ x   ] Abnormal [    ]      FUNCTIONAL STATUS:  Bed Mobility: [   ]  Independent [    ]  Supervision [  x  ]  Needs Assistance [  ]  N/A  Transfers: [    ]  Independent [    ]  Supervision [ x   ]  Needs Assistance [    ]  N/A    Ambulation:  [    ]  Independent [    ]  Supervision [  x  ]  Needs Assistance [    ]  N/A   ADL:  [    ]   Independent [  x  ] Requires Assistance [    ] N/A       LABS:                        10.8   9.24  )-----------( 194      ( 24 Sep 2019 04:39 )             32.9     09-24    143  |  107  |  8<L>  ----------------------------<  102<H>  4.4   |  25  |  0.5<L>    Ca    8.8      24 Sep 2019 04:39  Phos  3.8     09-22  Mg     2.3     09-22            RADIOLOGY & ADDITIONAL STUDIES:

## 2019-09-24 NOTE — DISCHARGE NOTE NURSING/CASE MANAGEMENT/SOCIAL WORK - PATIENT PORTAL LINK FT
You can access the FollowMyHealth Patient Portal offered by [facility] by registering at the following website: [email or web address]. By joining [facility]’s FollowMyHealth portal, you will also be able to view your health information using other applications (apps) compatible with our system.

## 2019-09-24 NOTE — PHYSICAL THERAPY INITIAL EVALUATION ADULT - LEVEL OF INDEPENDENCE: GAIT, REHAB EVAL
supervision/contact guard/pt ambulated with an antaglic gait on left. pt with fear of movement - pt educated that movement is good for her.

## 2019-09-24 NOTE — DIETITIAN INITIAL EVALUATION ADULT. - ENERGY NEEDS
Calories: 9023-1286 kcal/day (MSJ x 1.2-1.3 AF)  Protein: 42-50 gm/day (1-1.2 gm/kg CBW)  Fluid: 1 ml/kcal

## 2019-09-24 NOTE — CONSULT NOTE ADULT - ASSESSMENT
IMPRESSION: Rehab of multitrauma    PRECAUTIONS: [    x] Cardiac  [    x] Respiratory  [    ] Seizures [    ] Contact Isolation  [    ] Droplet Isolation  [    ] Other    Weight Bearing Status: WBAT LLE    RECOMMENDATION: CHECK PVR- ? OVERFLOW    Out of Bed to Chair     DVT/Decubiti Prophylaxis    REHAB PLAN:     [  x   ] Bedside P/T 3-5 times a week   [   x  ] Bedside O/T  2-3 times a week   [     ] No Rehab Therapy Indicated   [     ]  Speech Therapy   Conditioning/ROM                                 ADL  Bed Mobility                                            Conditioning/ROM  Transfers                                                  Bed Mobility  Sitting /Standing Balance                      Transfers                                        Gait Training                                            Sitting/Standing Balance  Stair Training [   ]Applicable                 Home equipment Eval                                                                     Splinting  [   ] Only      GOALS:   ADL   [  x  ]   Independent         Transfers  [  x  ] Independent            Ambulation  [  x   ] Independent     [ x    ] With device                            [    ]  CG                                               [    ]  CG                                                    [     ] CG                            [    ] Min A                                          [    ] Min A                                                [     ] Min  A          DISCHARGE PLAN:   [ x    ]  Good candidate for Intensive Rehabilitation/Hospital based                                             Will tolerate 3hrs Intensive Rehab Daily LIVES ALONE - NEEDS TO BE FUNCTIONALLY INDEPENDENT                                              INCLUDING ADL- WOULD BENEFIT FROM SHORT COURSE INTENSIVE REHAB                                       [      ]  Short Term Rehab in Skilled Nursing Facility                                       [      ]  Home with Outpatient or VN services                                         [      ]  Possible Candidate for Intensive Hospital based Rehab

## 2019-09-24 NOTE — DIETITIAN INITIAL EVALUATION ADULT. - PHYSICAL APPEARANCE
underweight/alert and oriented. BMI: 16.3, IBW: 115#, UBW: ~92# (pt states she's been stable at this weight for some years now, always been thin all her life), no edema noted, surgical incision.

## 2019-09-24 NOTE — DIETITIAN INITIAL EVALUATION ADULT. - PERTINENT MEDS FT
lovenox, dulcolax, amlodipine, maalox, colace, ferrous sulfate, mag hydroxide, MVI, zofran, protonix, miralax, senna, simvastatin

## 2019-09-24 NOTE — DIETITIAN INITIAL EVALUATION ADULT. - OTHER INFO
Nutrition Hx PTA: Pt with excellent appetite/po intake, typically consumes small, frequent meals + snacks daily. Pt has been doing this most of her life and denies use of oral nutrition supplements. Pt has NKFA and doesn't follow a specific diet at home.     Pt s/p fall with left femoral neck fracture and large left pneumothorax, s/p left chest tube placement, good placement of tube on CT w/ evidence of bullous disease. Can d/c CT today; CXR 4 hours after d/c CT.

## 2019-09-25 ENCOUNTER — INPATIENT (INPATIENT)
Facility: HOSPITAL | Age: 69
LOS: 11 days | Discharge: HOME HEALTH | End: 2019-10-07
Attending: PHYSICAL MEDICINE & REHABILITATION | Admitting: PHYSICAL MEDICINE & REHABILITATION
Payer: MEDICARE

## 2019-09-25 ENCOUNTER — TRANSCRIPTION ENCOUNTER (OUTPATIENT)
Age: 69
End: 2019-09-25

## 2019-09-25 VITALS — RESPIRATION RATE: 19 BRPM | OXYGEN SATURATION: 94 %

## 2019-09-25 PROBLEM — M19.90 UNSPECIFIED OSTEOARTHRITIS, UNSPECIFIED SITE: Chronic | Status: ACTIVE | Noted: 2019-09-22

## 2019-09-25 PROBLEM — E78.00 PURE HYPERCHOLESTEROLEMIA, UNSPECIFIED: Chronic | Status: ACTIVE | Noted: 2019-09-22

## 2019-09-25 PROBLEM — I10 ESSENTIAL (PRIMARY) HYPERTENSION: Chronic | Status: ACTIVE | Noted: 2019-09-22

## 2019-09-25 PROCEDURE — 99232 SBSQ HOSP IP/OBS MODERATE 35: CPT

## 2019-09-25 PROCEDURE — 93010 ELECTROCARDIOGRAM REPORT: CPT

## 2019-09-25 RX ORDER — SIMVASTATIN 20 MG/1
20 TABLET, FILM COATED ORAL AT BEDTIME
Refills: 0 | Status: DISCONTINUED | OUTPATIENT
Start: 2019-09-25 | End: 2019-10-07

## 2019-09-25 RX ORDER — DOCUSATE SODIUM 100 MG
100 CAPSULE ORAL
Refills: 0 | Status: DISCONTINUED | OUTPATIENT
Start: 2019-09-25 | End: 2019-10-07

## 2019-09-25 RX ORDER — CELECOXIB 200 MG/1
200 CAPSULE ORAL DAILY
Refills: 0 | Status: DISCONTINUED | OUTPATIENT
Start: 2019-09-25 | End: 2019-10-07

## 2019-09-25 RX ORDER — SENNA PLUS 8.6 MG/1
2 TABLET ORAL AT BEDTIME
Refills: 0 | Status: DISCONTINUED | OUTPATIENT
Start: 2019-09-25 | End: 2019-10-07

## 2019-09-25 RX ORDER — MAGNESIUM HYDROXIDE 400 MG/1
30 TABLET, CHEWABLE ORAL DAILY
Refills: 0 | Status: DISCONTINUED | OUTPATIENT
Start: 2019-09-25 | End: 2019-10-07

## 2019-09-25 RX ORDER — ENOXAPARIN SODIUM 100 MG/ML
40 INJECTION SUBCUTANEOUS EVERY 24 HOURS
Refills: 0 | Status: DISCONTINUED | OUTPATIENT
Start: 2019-09-26 | End: 2019-10-07

## 2019-09-25 RX ORDER — TRAMADOL HYDROCHLORIDE 50 MG/1
50 TABLET ORAL EVERY 6 HOURS
Refills: 0 | Status: DISCONTINUED | OUTPATIENT
Start: 2019-09-25 | End: 2019-09-25

## 2019-09-25 RX ORDER — CELECOXIB 200 MG/1
1 CAPSULE ORAL
Qty: 0 | Refills: 0 | DISCHARGE
Start: 2019-09-25

## 2019-09-25 RX ORDER — ACETAMINOPHEN 500 MG
650 TABLET ORAL EVERY 6 HOURS
Refills: 0 | Status: DISCONTINUED | OUTPATIENT
Start: 2019-09-25 | End: 2019-10-07

## 2019-09-25 RX ORDER — AMLODIPINE BESYLATE 2.5 MG/1
2.5 TABLET ORAL DAILY
Refills: 0 | Status: DISCONTINUED | OUTPATIENT
Start: 2019-09-25 | End: 2019-10-07

## 2019-09-25 RX ORDER — ACETAMINOPHEN 500 MG
2 TABLET ORAL
Qty: 0 | Refills: 0 | DISCHARGE
Start: 2019-09-25

## 2019-09-25 RX ORDER — LANOLIN ALCOHOL/MO/W.PET/CERES
3 CREAM (GRAM) TOPICAL AT BEDTIME
Refills: 0 | Status: DISCONTINUED | OUTPATIENT
Start: 2019-09-25 | End: 2019-10-07

## 2019-09-25 RX ADMIN — Medication 650 MILLIGRAM(S): at 06:43

## 2019-09-25 RX ADMIN — Medication 650 MILLIGRAM(S): at 12:08

## 2019-09-25 RX ADMIN — SIMVASTATIN 20 MILLIGRAM(S): 20 TABLET, FILM COATED ORAL at 21:09

## 2019-09-25 RX ADMIN — Medication 650 MILLIGRAM(S): at 12:13

## 2019-09-25 RX ADMIN — CELECOXIB 200 MILLIGRAM(S): 200 CAPSULE ORAL at 12:07

## 2019-09-25 RX ADMIN — CELECOXIB 200 MILLIGRAM(S): 200 CAPSULE ORAL at 12:13

## 2019-09-25 RX ADMIN — Medication 325 MILLIGRAM(S): at 12:08

## 2019-09-25 RX ADMIN — Medication 650 MILLIGRAM(S): at 05:36

## 2019-09-25 RX ADMIN — POTASSIUM PHOSPHATE, MONOBASIC POTASSIUM PHOSPHATE, DIBASIC 83.33 MILLIMOLE(S): 236; 224 INJECTION, SOLUTION INTRAVENOUS at 00:43

## 2019-09-25 RX ADMIN — POLYETHYLENE GLYCOL 3350 17 GRAM(S): 17 POWDER, FOR SOLUTION ORAL at 12:08

## 2019-09-25 RX ADMIN — SENNA PLUS 2 TABLET(S): 8.6 TABLET ORAL at 21:09

## 2019-09-25 RX ADMIN — PANTOPRAZOLE SODIUM 40 MILLIGRAM(S): 20 TABLET, DELAYED RELEASE ORAL at 05:36

## 2019-09-25 RX ADMIN — Medication 325 MILLIGRAM(S): at 09:02

## 2019-09-25 RX ADMIN — Medication 1 TABLET(S): at 12:08

## 2019-09-25 RX ADMIN — AMLODIPINE BESYLATE 2.5 MILLIGRAM(S): 2.5 TABLET ORAL at 05:36

## 2019-09-25 RX ADMIN — Medication 650 MILLIGRAM(S): at 01:12

## 2019-09-25 RX ADMIN — Medication 100 MILLIGRAM(S): at 21:09

## 2019-09-25 RX ADMIN — Medication 100 MILLIGRAM(S): at 05:36

## 2019-09-25 RX ADMIN — ENOXAPARIN SODIUM 40 MILLIGRAM(S): 100 INJECTION SUBCUTANEOUS at 12:08

## 2019-09-25 RX ADMIN — Medication 650 MILLIGRAM(S): at 00:43

## 2019-09-25 NOTE — CDI QUERY NOTE - NSCDIOTHERTXTBX_GEN_ALL_CORE_HH
Clinical documentation indicates that this patient has  bullous disease of the lung. she presented after fall and found to have fracture femur and pneumothorax. pt was asymptomatic. documented by thoracic surgery: Patient underwent CT chest noncontrast that revealed significant left bullous disease with residual PTX suggestive long standing process    In order to accurately capture the diagnosis to the greatest degree of specificity. The documentation in this patient’s medical record requires additional clarification.  Please include more specific diagnosis in your Progress Note and/or Discharge Summary.    • Traumatic pneumothorax	  • Spontaneous pneumothorax  • Other (please specify)  • Unable  to determine    Present on Admission:  Was the severity of the condition present on admission?  If so, please document in the chart that “(the condition) was present on admission.”    In responding to this request, please exercise your independent professional judgment.  The fact that a question is asked does not imply that any particular answer is desired or expected.    Documentation clarification is required for compliance, accuracy in coding and billing, and reporting severity of illness, quality data   and risk of mortality.

## 2019-09-25 NOTE — PROGRESS NOTE ADULT - ASSESSMENT
68y Female s/p mech fall w/ L ptx s/p ct and L femur fx s/p total hip. Now with left Chest tube removed, with no significant change in left pneumothorax.     PLAN:  -F/U AM CXR  -pain control   -Diet Regular  -OT: ADL retraining  -PT: intensive rehab  -Physiatry: intensive rehab  -pt/re/sw .

## 2019-09-25 NOTE — PROGRESS NOTE ADULT - SUBJECTIVE AND OBJECTIVE BOX
SHOSHANA ZARATE  68y Female   [id number]    Hospital Day:3   Post Operative Day:  Procedure: hip arthroplasty   Patient is a 68y old  Female who presents as a fall with hip fx , patient had chest tube placed due to left sided large bleb (23 Sep 2019 10:06)    PAST MEDICAL & SURGICAL HISTORY:  Osteoarthritis  High cholesterol  HTN (hypertension)      Events of the Last 24h:chest tube removed  Vital Signs Last 24 Hrs  T(C): 36.8 (24 Sep 2019 08:12), Max: 37 (24 Sep 2019 04:00)  T(F): 98.2 (24 Sep 2019 08:12), Max: 98.6 (24 Sep 2019 04:00)  HR: 99 (24 Sep 2019 08:12) (95 - 99)  BP: 134/73 (24 Sep 2019 08:12) (134/73 - 140/67)  BP(mean): --  RR: 18 (24 Sep 2019 08:12) (18 - 18)  SpO2: --        Diet, Regular:   DASH/TLC Sodium & Cholesterol Restricted (19 @ 21:06)      I&O's Summary    23 Sep 2019 07:  -  24 Sep 2019 07:00  --------------------------------------------------------  IN: 105 mL / OUT: 475 mL / NET: -370 mL     I&O's Detail    23 Sep 2019 07:  -  24 Sep 2019 07:00  --------------------------------------------------------  IN:    lactated ringers.: 105 mL  Total IN: 105 mL    OUT:    Voided: 475 mL  Total OUT: 475 mL    Total NET: -370 mL          MEDICATIONS  (STANDING):  acetaminophen   Tablet .. 650 milliGRAM(s) Oral every 6 hours  amLODIPine   Tablet 2.5 milliGRAM(s) Oral daily  celecoxib 200 milliGRAM(s) Oral daily  chlorhexidine 4% Liquid 1 Application(s) Topical <User Schedule>  docusate sodium 100 milliGRAM(s) Oral three times a day  enoxaparin Injectable 40 milliGRAM(s) SubCutaneous daily  ferrous    sulfate 325 milliGRAM(s) Oral three times a day with meals  multivitamin 1 Tablet(s) Oral daily  pantoprazole    Tablet 40 milliGRAM(s) Oral before breakfast  polyethylene glycol 3350 17 Gram(s) Oral daily  potassium phosphate IVPB 30 milliMole(s) IV Intermittent once  simvastatin 20 milliGRAM(s) Oral at bedtime    MEDICATIONS  (PRN):  aluminum hydroxide/magnesium hydroxide/simethicone Suspension 30 milliLiter(s) Oral four times a day PRN Indigestion  bisacodyl Suppository 10 milliGRAM(s) Rectal daily PRN If no bowel movement by POD#2  magnesium hydroxide Suspension 30 milliLiter(s) Oral daily PRN Constipation  ondansetron Injectable 4 milliGRAM(s) IV Push every 6 hours PRN Nausea and/or Vomiting  senna 2 Tablet(s) Oral at bedtime PRN Constipation  traMADol 50 milliGRAM(s) Oral every 6 hours PRN Severe Pain (7 - 10)      PHYSICAL EXAM:    GENERAL: NAD    HEENT: NCAT    CHEST/LUNGS: CTAB    HEART: RRR,  No murmurs, rubs, or gallops    ABDOMEN: SNTND +BS    EXTREMITIES:  FROM, No clubbing, cyanosis, or edema, palpable pulse    NEURO: No focal neurological deficits    SKIN: No rashes or lesions    INCISION/WOUNDS:                          9.7    9.97  )-----------( 189      ( 24 Sep 2019 21:43 )             29.1        CBC Full  -  ( 24 Sep 2019 21:43 )  WBC Count : 9.97 K/uL  RBC Count : 3.22 M/uL  Hemoglobin : 9.7 g/dL  Hematocrit : 29.1 %  Platelet Count - Automated : 189 K/uL  Mean Cell Volume : 90.4 fL  Mean Cell Hemoglobin : 30.1 pg  Mean Cell Hemoglobin Concentration : 33.3 g/dL  Auto Neutrophil # : 8.23 K/uL  Auto Lymphocyte # : 0.89 K/uL  Auto Monocyte # : 0.69 K/uL  Auto Eosinophil # : 0.10 K/uL  Auto Basophil # : 0.03 K/uL  Auto Neutrophil % : 82.6 %  Auto Lymphocyte % : 8.9 %  Auto Monocyte % : 6.9 %  Auto Eosinophil % : 1.0 %  Auto Basophil % : 0.3 %               138   |  103   |  13                 Ca: 8.5    BMP:   ----------------------------< 215    M.9   (19 @ 21:43)             3.6    |  22    | 0.7                Ph: 1.4      LFT:     TPro: 7.4 / Alb: 4.9 / TBili: 0.6 / DBili: x / AST: 30 / ALT: 21 / AlkPhos: 66   (19 @ 14:40)        CARDIAC MARKERS ( 23 Sep 2019 21:06 )  x     / <0.01 ng/mL / 789 U/L / x     / 6.1 ng/mL

## 2019-09-25 NOTE — DISCHARGE NOTE PROVIDER - CARE PROVIDERS DIRECT ADDRESSES
,DirectAddress_Unknown,[email or web address]

## 2019-09-25 NOTE — DISCHARGE NOTE PROVIDER - NSDCCPCAREPLAN_GEN_ALL_CORE_FT
PRINCIPAL DISCHARGE DIAGNOSIS  Diagnosis: Closed fracture of neck of left femur, initial encounter  Assessment and Plan of Treatment:

## 2019-09-25 NOTE — DISCHARGE NOTE PROVIDER - NSDCFUADDINST_GEN_ALL_CORE_FT
Follow up with Dr. Llamas in office in 2 weeks. Please call to schedule an appointment, number is provided below.   Chest tube dressing to stay on for the next 2 days, after removal no swimming, no hot tubes, or baths for 4 weeks.   Follow up with Dr. Collins in office in 1-2 weeks. Please call to schedule an appointment, number is provided below.

## 2019-09-25 NOTE — DISCHARGE NOTE PROVIDER - CARE PROVIDER_API CALL
Enrrique Llamas)  Surgery; Thoracic Surgery  [address] [place] [address], [address]  [place] [address]  Phone: [phone]  Fax: [phone]  Follow Up Time:     Alistair Collins)  Orthopaedic Surgery  [address] [place] [address]  Phone: [phone]  Fax: [phone]  Follow Up Time:

## 2019-09-25 NOTE — PROGRESS NOTE ADULT - SUBJECTIVE AND OBJECTIVE BOX
ORTHO PROGRESS NOTE     68yFemale POD # 2  S/P left Total Hip Arthroplasty for femoral neck fracture.  Chest tube removed.     Patient seen and examined at bedside . The patient is awake and alert in NAD. No complaints of chest pain, SOB, N/V.    ICU Vital Signs Last 24 Hrs  T(C): 35.9 (25 Sep 2019 01:16), Max: 36.8 (24 Sep 2019 08:12)  T(F): 96.7 (25 Sep 2019 01:16), Max: 98.2 (24 Sep 2019 08:12)  HR: 100 (25 Sep 2019 01:16) (99 - 100)  BP: 115/61 (25 Sep 2019 01:16) (115/61 - 134/73)  BP(mean): --  ABP: --  ABP(mean): --  RR: 18 (25 Sep 2019 01:16) (18 - 18)  SpO2: --                               9.7    9.97  )-----------( 189      ( 24 Sep 2019 21:43 )             29.1         PE:    left hip dressing C/D/I          Compartments soft         NVI, SILT       A/P: 68yFemale POD # 2   S/P   left Total Hip Arthroplasty for femoral neck fracture            WBAT           OOB to Chair            Physical Therapy           Pain control            Incentive Spirometry            DVT Prophylaxis            Discharge planning

## 2019-09-25 NOTE — DISCHARGE NOTE PROVIDER - HOSPITAL COURSE
68F, PMHx HTN, HLD, presented to ED s/p trip and fall, -HT, -LOC, -AC, w/ L hip pain. Imaging showed Left subcapital femoral neck fracture and left pneumothorax and chest xray. Pt had a chest tube placed in the ED and CT showed large bleb disease. Pt was admitted to surgery service. Pt went to the OR for Left total hip arthroplasty. Pt tolerated procedure well, pain well controlled, WBAT, tolerated advancement in diet. Chest tube was removed before discharge, patient denied SOB or CP, or trouble breathing. Pt was evaluated by physical therapy and rehab which recommended 4A rehab. Pt is being discharged to 4A with the appropriate follow up instructions.

## 2019-09-25 NOTE — PROGRESS NOTE ADULT - ATTENDING COMMENTS
68y Female s/p mech fall w/ L ptx s/p ct and L femur fx s/p total hip. Now with left Chest tube removed, with no significant change in left pneumothorax.     PLAN:  -F/U AM CXR  -pain control   -Diet Regular  -OT: ADL retraining  -PT: intensive rehab  -Physiatry: intensive rehab  -pt/re/sw .
68y Female s/p mech fall w/ L ptx s/p ct and L femur fx s/p total hip. Consulted SICU for ICU care    PLAN:    -SICU recommendations  -pain control   -chest tube to suction  -pain control  -Diet Regular  -pt/re/sw
General Thoracic Surgery Attestation    I have seen and examined the patient.  Where appropriate I have updated, edited, or corrected the resident's or PA's note with regard to findings, values, and plan.    patient stable.  imaging stable after chest tube removal.  plan for clinic f/u in 2 weeks to discuss elective surgical planning vs observation.  chest tube dressing should stay on for 2 days after removal, no swimming, hot tubs, or baths for 4 weeks.
General Thoracic Surgery Attestation    I have seen and examined the patient.  Where appropriate I have updated, edited, or corrected the resident's or PA's note with regard to findings, values, and plan.    patient with large bullous disease.  there is a chest tube in place which I would prefer to remove.  however patient apparently going to OR with ortho today, so will chest tube in and on suction during perioperative period, remove after, likely tomorrow.  no evidence of air leak.  some pleural air now, which I think is post procedural.    from the standpoint of OR today:    -would perform ortho intervention under local/block/MAC if possible.  -if not possible consider LMA to avoid PPV  -if positive pressure ventilation needed (i.e. intubation), chest tube in place will protect from intrapleural air.  however, it should be noted that patients with large asymmetric bullae can occasional trap air.  this would be manifested by rising airway pressures, and progressive shift towards the right on intraoperative imaging.  the immediate treatment for this would be to isolate the lung with a bronchial blocker, which thoracic can assist with if needed.  overall I suspect the risk of this is low but see above for recommendations regarding the case to avoid this.
68 year old female s/p mechanical fall with left subcapital femoral neck fracture and left pneumothorax on chest xray s/p chest tube in good placement on CT with evidence of bullous disease post-tube placement    PLAN:  -medical optimization/clearence for OR  -Pre op for ortho surgery - left femoral subcapital fracture, today  -CT surgery for bullous disease  -pain control   -chest tube to suction  -pain control  -npo/iv  -pt/re/sw

## 2019-09-25 NOTE — PROGRESS NOTE ADULT - ASSESSMENT
AM chest xray  monitor oxygen saturation  please call ct surgery team as needed AM chest xray  monitor oxygen saturation  Follow up with Dr. Llamas outpatient for further management of bleb disease  please call ct surgery team as needed

## 2019-09-25 NOTE — PROGRESS NOTE ADULT - SUBJECTIVE AND OBJECTIVE BOX
GENERAL SURGERY PROGRESS NOTE     SHOSHANA ZARATE  68y  Female  Hospital day :3d  POD:2  Procedure: Left total hip arthroplasty    OVERNIGHT EVENTS: Left chest tube removed    T(F): 96.7 (09-25-19 @ 01:16), Max: 98.2 (09-24-19 @ 08:12)  HR: 100 (09-25-19 @ 01:16) (99 - 100)  BP: 115/61 (09-25-19 @ 01:16) (115/61 - 134/73)  ABP: --  ABP(mean): --  RR: 18 (09-25-19 @ 01:16) (18 - 18)  SpO2: --    DIET/FLUIDS: ferrous    sulfate 325 milliGRAM(s) Oral three times a day with meals  multivitamin 1 Tablet(s) Oral daily         GI proph:  pantoprazole    Tablet 40 milliGRAM(s) Oral before breakfast    AC/ proph:   ABx:     PHYSICAL EXAM:  GENERAL: NAD, well-appearing  CHEST/LUNG: Clear to auscultation bilaterally  HEART: Regular rate and rhythm  ABDOMEN: Soft, Nontender, Nondistended;   EXTREMITIES:  No clubbing, cyanosis, or edema      LABS  Labs:  CAPILLARY BLOOD GLUCOSE                              9.7    9.97  )-----------( 189      ( 24 Sep 2019 21:43 )             29.1       Auto Neutrophil %: 82.6 % (09-24-19 @ 21:43)  Auto Immature Granulocyte %: 0.3 % (09-24-19 @ 21:43)    09-24    138  |  103  |  13  ----------------------------<  215<H>  3.6   |  22  |  0.7      Calcium, Total Serum: 8.5 mg/dL (09-24-19 @ 21:43)         Coags:    CARDIAC MARKERS ( 23 Sep 2019 21:06 )  x     / <0.01 ng/mL / 789 U/L / x     / 6.1 ng/mL                  RADIOLOGY & ADDITIONAL TESTS:    < from: Xray Chest 1 View AP/PA (09.23.19 @ 22:12) >  Impression:      No significant change in large left pneumothorax.    Support devices as described above.    < end of copied text >

## 2019-09-26 LAB
ALBUMIN SERPL ELPH-MCNC: 3 G/DL — LOW (ref 3.5–5.2)
ALP SERPL-CCNC: 103 U/L — SIGNIFICANT CHANGE UP (ref 30–115)
ALT FLD-CCNC: 33 U/L — SIGNIFICANT CHANGE UP (ref 0–41)
ANION GAP SERPL CALC-SCNC: 10 MMOL/L — SIGNIFICANT CHANGE UP (ref 7–14)
APPEARANCE UR: CLEAR — SIGNIFICANT CHANGE UP
AST SERPL-CCNC: 42 U/L — HIGH (ref 0–41)
BACTERIA # UR AUTO: NEGATIVE — SIGNIFICANT CHANGE UP
BILIRUB SERPL-MCNC: 0.7 MG/DL — SIGNIFICANT CHANGE UP (ref 0.2–1.2)
BILIRUB UR-MCNC: NEGATIVE — SIGNIFICANT CHANGE UP
BUN SERPL-MCNC: 22 MG/DL — HIGH (ref 10–20)
CALCIUM SERPL-MCNC: 8.6 MG/DL — SIGNIFICANT CHANGE UP (ref 8.5–10.1)
CHLORIDE SERPL-SCNC: 103 MMOL/L — SIGNIFICANT CHANGE UP (ref 98–110)
CO2 SERPL-SCNC: 26 MMOL/L — SIGNIFICANT CHANGE UP (ref 17–32)
COLOR SPEC: YELLOW — SIGNIFICANT CHANGE UP
CREAT SERPL-MCNC: 0.6 MG/DL — LOW (ref 0.7–1.5)
DIFF PNL FLD: SIGNIFICANT CHANGE UP
EPI CELLS # UR: 2 /HPF — SIGNIFICANT CHANGE UP (ref 0–5)
GLUCOSE SERPL-MCNC: 92 MG/DL — SIGNIFICANT CHANGE UP (ref 70–99)
GLUCOSE UR QL: NEGATIVE — SIGNIFICANT CHANGE UP
HCT VFR BLD CALC: 29.2 % — LOW (ref 37–47)
HGB BLD-MCNC: 9.6 G/DL — LOW (ref 12–16)
HYALINE CASTS # UR AUTO: 3 /LPF — SIGNIFICANT CHANGE UP (ref 0–7)
KETONES UR-MCNC: NEGATIVE — SIGNIFICANT CHANGE UP
LEUKOCYTE ESTERASE UR-ACNC: NEGATIVE — SIGNIFICANT CHANGE UP
MAGNESIUM SERPL-MCNC: 2 MG/DL — SIGNIFICANT CHANGE UP (ref 1.8–2.4)
MCHC RBC-ENTMCNC: 29.7 PG — SIGNIFICANT CHANGE UP (ref 27–31)
MCHC RBC-ENTMCNC: 32.9 G/DL — SIGNIFICANT CHANGE UP (ref 32–37)
MCV RBC AUTO: 90.4 FL — SIGNIFICANT CHANGE UP (ref 81–99)
NITRITE UR-MCNC: NEGATIVE — SIGNIFICANT CHANGE UP
NRBC # BLD: 0 /100 WBCS — SIGNIFICANT CHANGE UP (ref 0–0)
PH UR: 6.5 — SIGNIFICANT CHANGE UP (ref 5–8)
PLATELET # BLD AUTO: 213 K/UL — SIGNIFICANT CHANGE UP (ref 130–400)
POTASSIUM SERPL-MCNC: 4.5 MMOL/L — SIGNIFICANT CHANGE UP (ref 3.5–5)
POTASSIUM SERPL-SCNC: 4.5 MMOL/L — SIGNIFICANT CHANGE UP (ref 3.5–5)
PROT SERPL-MCNC: 5.2 G/DL — LOW (ref 6–8)
PROT UR-MCNC: ABNORMAL
RBC # BLD: 3.23 M/UL — LOW (ref 4.2–5.4)
RBC # FLD: 13.2 % — SIGNIFICANT CHANGE UP (ref 11.5–14.5)
RBC CASTS # UR COMP ASSIST: 16 /HPF — HIGH (ref 0–4)
SODIUM SERPL-SCNC: 139 MMOL/L — SIGNIFICANT CHANGE UP (ref 135–146)
SP GR SPEC: 1.02 — SIGNIFICANT CHANGE UP (ref 1.01–1.02)
UROBILINOGEN FLD QL: ABNORMAL
WBC # BLD: 8.18 K/UL — SIGNIFICANT CHANGE UP (ref 4.8–10.8)
WBC # FLD AUTO: 8.18 K/UL — SIGNIFICANT CHANGE UP (ref 4.8–10.8)
WBC UR QL: 3 /HPF — SIGNIFICANT CHANGE UP (ref 0–5)

## 2019-09-26 RX ORDER — TAMSULOSIN HYDROCHLORIDE 0.4 MG/1
0.4 CAPSULE ORAL AT BEDTIME
Refills: 0 | Status: DISCONTINUED | OUTPATIENT
Start: 2019-09-26 | End: 2019-10-07

## 2019-09-26 RX ORDER — POLYETHYLENE GLYCOL 3350 17 G/17G
17 POWDER, FOR SOLUTION ORAL AT BEDTIME
Refills: 0 | Status: DISCONTINUED | OUTPATIENT
Start: 2019-09-26 | End: 2019-10-07

## 2019-09-26 RX ADMIN — SIMVASTATIN 20 MILLIGRAM(S): 20 TABLET, FILM COATED ORAL at 21:30

## 2019-09-26 RX ADMIN — Medication 1 TABLET(S): at 12:09

## 2019-09-26 RX ADMIN — TAMSULOSIN HYDROCHLORIDE 0.4 MILLIGRAM(S): 0.4 CAPSULE ORAL at 21:32

## 2019-09-26 RX ADMIN — ENOXAPARIN SODIUM 40 MILLIGRAM(S): 100 INJECTION SUBCUTANEOUS at 10:07

## 2019-09-26 RX ADMIN — AMLODIPINE BESYLATE 2.5 MILLIGRAM(S): 2.5 TABLET ORAL at 06:35

## 2019-09-26 RX ADMIN — CELECOXIB 200 MILLIGRAM(S): 200 CAPSULE ORAL at 12:09

## 2019-09-26 RX ADMIN — POLYETHYLENE GLYCOL 3350 17 GRAM(S): 17 POWDER, FOR SOLUTION ORAL at 21:31

## 2019-09-26 RX ADMIN — SENNA PLUS 2 TABLET(S): 8.6 TABLET ORAL at 21:30

## 2019-09-26 RX ADMIN — Medication 100 MILLIGRAM(S): at 06:35

## 2019-09-27 RX ORDER — PANTOPRAZOLE SODIUM 20 MG/1
40 TABLET, DELAYED RELEASE ORAL
Refills: 0 | Status: DISCONTINUED | OUTPATIENT
Start: 2019-09-27 | End: 2019-10-07

## 2019-09-27 RX ADMIN — AMLODIPINE BESYLATE 2.5 MILLIGRAM(S): 2.5 TABLET ORAL at 05:58

## 2019-09-27 RX ADMIN — Medication 650 MILLIGRAM(S): at 08:56

## 2019-09-27 RX ADMIN — PANTOPRAZOLE SODIUM 40 MILLIGRAM(S): 20 TABLET, DELAYED RELEASE ORAL at 14:53

## 2019-09-27 RX ADMIN — CELECOXIB 200 MILLIGRAM(S): 200 CAPSULE ORAL at 11:53

## 2019-09-27 RX ADMIN — Medication 650 MILLIGRAM(S): at 10:12

## 2019-09-27 RX ADMIN — SENNA PLUS 2 TABLET(S): 8.6 TABLET ORAL at 21:17

## 2019-09-27 RX ADMIN — TAMSULOSIN HYDROCHLORIDE 0.4 MILLIGRAM(S): 0.4 CAPSULE ORAL at 21:17

## 2019-09-27 RX ADMIN — Medication 100 MILLIGRAM(S): at 17:38

## 2019-09-27 RX ADMIN — CELECOXIB 200 MILLIGRAM(S): 200 CAPSULE ORAL at 14:53

## 2019-09-27 RX ADMIN — Medication 100 MILLIGRAM(S): at 05:58

## 2019-09-27 RX ADMIN — ENOXAPARIN SODIUM 40 MILLIGRAM(S): 100 INJECTION SUBCUTANEOUS at 11:53

## 2019-09-27 RX ADMIN — SIMVASTATIN 20 MILLIGRAM(S): 20 TABLET, FILM COATED ORAL at 21:16

## 2019-09-27 RX ADMIN — Medication 1 TABLET(S): at 11:53

## 2019-09-28 RX ADMIN — CELECOXIB 200 MILLIGRAM(S): 200 CAPSULE ORAL at 12:18

## 2019-09-28 RX ADMIN — SIMVASTATIN 20 MILLIGRAM(S): 20 TABLET, FILM COATED ORAL at 21:35

## 2019-09-28 RX ADMIN — TAMSULOSIN HYDROCHLORIDE 0.4 MILLIGRAM(S): 0.4 CAPSULE ORAL at 21:35

## 2019-09-28 RX ADMIN — Medication 1 TABLET(S): at 12:18

## 2019-09-28 RX ADMIN — AMLODIPINE BESYLATE 2.5 MILLIGRAM(S): 2.5 TABLET ORAL at 06:42

## 2019-09-28 RX ADMIN — ENOXAPARIN SODIUM 40 MILLIGRAM(S): 100 INJECTION SUBCUTANEOUS at 12:18

## 2019-09-28 RX ADMIN — Medication 100 MILLIGRAM(S): at 06:42

## 2019-09-28 RX ADMIN — PANTOPRAZOLE SODIUM 40 MILLIGRAM(S): 20 TABLET, DELAYED RELEASE ORAL at 08:02

## 2019-09-29 PROCEDURE — 93970 EXTREMITY STUDY: CPT | Mod: 26

## 2019-09-29 RX ADMIN — PANTOPRAZOLE SODIUM 40 MILLIGRAM(S): 20 TABLET, DELAYED RELEASE ORAL at 05:48

## 2019-09-29 RX ADMIN — Medication 100 MILLIGRAM(S): at 05:48

## 2019-09-29 RX ADMIN — AMLODIPINE BESYLATE 2.5 MILLIGRAM(S): 2.5 TABLET ORAL at 05:48

## 2019-09-29 RX ADMIN — SIMVASTATIN 20 MILLIGRAM(S): 20 TABLET, FILM COATED ORAL at 20:50

## 2019-09-29 RX ADMIN — Medication 1 TABLET(S): at 11:13

## 2019-09-29 RX ADMIN — TAMSULOSIN HYDROCHLORIDE 0.4 MILLIGRAM(S): 0.4 CAPSULE ORAL at 20:50

## 2019-09-29 RX ADMIN — CELECOXIB 200 MILLIGRAM(S): 200 CAPSULE ORAL at 11:12

## 2019-09-29 RX ADMIN — ENOXAPARIN SODIUM 40 MILLIGRAM(S): 100 INJECTION SUBCUTANEOUS at 11:12

## 2019-09-29 RX ADMIN — Medication 650 MILLIGRAM(S): at 13:08

## 2019-09-29 RX ADMIN — CELECOXIB 200 MILLIGRAM(S): 200 CAPSULE ORAL at 11:14

## 2019-09-30 LAB
24R-OH-CALCIDIOL SERPL-MCNC: 40 NG/ML — SIGNIFICANT CHANGE UP (ref 30–80)
ALBUMIN SERPL ELPH-MCNC: 3.5 G/DL — SIGNIFICANT CHANGE UP (ref 3.5–5.2)
ALP SERPL-CCNC: 357 U/L — HIGH (ref 30–115)
ALT FLD-CCNC: 148 U/L — HIGH (ref 0–41)
ANION GAP SERPL CALC-SCNC: 11 MMOL/L — SIGNIFICANT CHANGE UP (ref 7–14)
AST SERPL-CCNC: 110 U/L — HIGH (ref 0–41)
BILIRUB SERPL-MCNC: 0.5 MG/DL — SIGNIFICANT CHANGE UP (ref 0.2–1.2)
BUN SERPL-MCNC: 17 MG/DL — SIGNIFICANT CHANGE UP (ref 10–20)
CALCIUM SERPL-MCNC: 9.3 MG/DL — SIGNIFICANT CHANGE UP (ref 8.5–10.1)
CHLORIDE SERPL-SCNC: 100 MMOL/L — SIGNIFICANT CHANGE UP (ref 98–110)
CO2 SERPL-SCNC: 28 MMOL/L — SIGNIFICANT CHANGE UP (ref 17–32)
CREAT SERPL-MCNC: 0.5 MG/DL — LOW (ref 0.7–1.5)
GLUCOSE SERPL-MCNC: 102 MG/DL — HIGH (ref 70–99)
HCT VFR BLD CALC: 31 % — LOW (ref 37–47)
HGB BLD-MCNC: 10 G/DL — LOW (ref 12–16)
MAGNESIUM SERPL-MCNC: 2.1 MG/DL — SIGNIFICANT CHANGE UP (ref 1.8–2.4)
MCHC RBC-ENTMCNC: 29.5 PG — SIGNIFICANT CHANGE UP (ref 27–31)
MCHC RBC-ENTMCNC: 32.3 G/DL — SIGNIFICANT CHANGE UP (ref 32–37)
MCV RBC AUTO: 91.4 FL — SIGNIFICANT CHANGE UP (ref 81–99)
NRBC # BLD: 0 /100 WBCS — SIGNIFICANT CHANGE UP (ref 0–0)
PLATELET # BLD AUTO: 500 K/UL — HIGH (ref 130–400)
POTASSIUM SERPL-MCNC: 4.8 MMOL/L — SIGNIFICANT CHANGE UP (ref 3.5–5)
POTASSIUM SERPL-SCNC: 4.8 MMOL/L — SIGNIFICANT CHANGE UP (ref 3.5–5)
PROT SERPL-MCNC: 6.2 G/DL — SIGNIFICANT CHANGE UP (ref 6–8)
RBC # BLD: 3.39 M/UL — LOW (ref 4.2–5.4)
RBC # FLD: 13.1 % — SIGNIFICANT CHANGE UP (ref 11.5–14.5)
SODIUM SERPL-SCNC: 139 MMOL/L — SIGNIFICANT CHANGE UP (ref 135–146)
SURGICAL PATHOLOGY STUDY: SIGNIFICANT CHANGE UP
WBC # BLD: 8.93 K/UL — SIGNIFICANT CHANGE UP (ref 4.8–10.8)
WBC # FLD AUTO: 8.93 K/UL — SIGNIFICANT CHANGE UP (ref 4.8–10.8)

## 2019-09-30 RX ADMIN — ENOXAPARIN SODIUM 40 MILLIGRAM(S): 100 INJECTION SUBCUTANEOUS at 12:39

## 2019-09-30 RX ADMIN — SIMVASTATIN 20 MILLIGRAM(S): 20 TABLET, FILM COATED ORAL at 21:17

## 2019-09-30 RX ADMIN — SENNA PLUS 2 TABLET(S): 8.6 TABLET ORAL at 21:17

## 2019-09-30 RX ADMIN — Medication 100 MILLIGRAM(S): at 05:15

## 2019-09-30 RX ADMIN — Medication 100 MILLIGRAM(S): at 17:22

## 2019-09-30 RX ADMIN — CELECOXIB 200 MILLIGRAM(S): 200 CAPSULE ORAL at 12:43

## 2019-09-30 RX ADMIN — Medication 650 MILLIGRAM(S): at 08:00

## 2019-09-30 RX ADMIN — Medication 1 TABLET(S): at 12:40

## 2019-09-30 RX ADMIN — Medication 650 MILLIGRAM(S): at 12:37

## 2019-09-30 RX ADMIN — CELECOXIB 200 MILLIGRAM(S): 200 CAPSULE ORAL at 12:40

## 2019-09-30 RX ADMIN — PANTOPRAZOLE SODIUM 40 MILLIGRAM(S): 20 TABLET, DELAYED RELEASE ORAL at 05:15

## 2019-09-30 RX ADMIN — TAMSULOSIN HYDROCHLORIDE 0.4 MILLIGRAM(S): 0.4 CAPSULE ORAL at 21:17

## 2019-10-01 DIAGNOSIS — E78.5 HYPERLIPIDEMIA, UNSPECIFIED: ICD-10-CM

## 2019-10-01 DIAGNOSIS — J43.9 EMPHYSEMA, UNSPECIFIED: ICD-10-CM
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
## 2019-10-01 DIAGNOSIS — J98.11 ATELECTASIS: ICD-10-CM

## 2019-10-01 DIAGNOSIS — J93.9 PNEUMOTHORAX, UNSPECIFIED: ICD-10-CM
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
## 2019-10-01 DIAGNOSIS — M19.90 UNSPECIFIED OSTEOARTHRITIS, UNSPECIFIED SITE: ICD-10-CM

## 2019-10-01 DIAGNOSIS — I10 ESSENTIAL (PRIMARY) HYPERTENSION: ICD-10-CM
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
## 2019-10-01 DIAGNOSIS — S72.002A FRACTURE OF UNSPECIFIED PART OF NECK OF LEFT FEMUR, INITIAL ENCOUNTER FOR CLOSED FRACTURE: ICD-10-CM

## 2019-10-01 DIAGNOSIS — R63.6 UNDERWEIGHT: ICD-10-CM

## 2019-10-01 DIAGNOSIS — L13.9 BULLOUS DISORDER, UNSPECIFIED: ICD-10-CM

## 2019-10-01 LAB
APPEARANCE UR: CLEAR — SIGNIFICANT CHANGE UP
BACTERIA # UR AUTO: ABNORMAL
BILIRUB UR-MCNC: NEGATIVE — SIGNIFICANT CHANGE UP
COLOR SPEC: YELLOW — SIGNIFICANT CHANGE UP
DIFF PNL FLD: ABNORMAL
EPI CELLS # UR: 0 /HPF — SIGNIFICANT CHANGE UP (ref 0–5)
GLUCOSE UR QL: NEGATIVE — SIGNIFICANT CHANGE UP
HYALINE CASTS # UR AUTO: 0 /LPF — SIGNIFICANT CHANGE UP (ref 0–7)
KETONES UR-MCNC: NEGATIVE — SIGNIFICANT CHANGE UP
LEUKOCYTE ESTERASE UR-ACNC: ABNORMAL
NITRITE UR-MCNC: NEGATIVE — SIGNIFICANT CHANGE UP
PH UR: 6.5 — SIGNIFICANT CHANGE UP (ref 5–8)
PROT UR-MCNC: SIGNIFICANT CHANGE UP
RBC CASTS # UR COMP ASSIST: 10 /HPF — HIGH (ref 0–4)
SP GR SPEC: 1.01 — LOW (ref 1.01–1.02)
UROBILINOGEN FLD QL: SIGNIFICANT CHANGE UP
WBC UR QL: 9 /HPF — HIGH (ref 0–5)

## 2019-10-01 RX ADMIN — ENOXAPARIN SODIUM 40 MILLIGRAM(S): 100 INJECTION SUBCUTANEOUS at 13:59

## 2019-10-01 RX ADMIN — Medication 100 MILLIGRAM(S): at 05:50

## 2019-10-01 RX ADMIN — SENNA PLUS 2 TABLET(S): 8.6 TABLET ORAL at 21:19

## 2019-10-01 RX ADMIN — Medication 100 MILLIGRAM(S): at 17:06

## 2019-10-01 RX ADMIN — PANTOPRAZOLE SODIUM 40 MILLIGRAM(S): 20 TABLET, DELAYED RELEASE ORAL at 05:51

## 2019-10-01 RX ADMIN — SIMVASTATIN 20 MILLIGRAM(S): 20 TABLET, FILM COATED ORAL at 21:19

## 2019-10-01 RX ADMIN — CELECOXIB 200 MILLIGRAM(S): 200 CAPSULE ORAL at 13:58

## 2019-10-01 RX ADMIN — TAMSULOSIN HYDROCHLORIDE 0.4 MILLIGRAM(S): 0.4 CAPSULE ORAL at 21:19

## 2019-10-01 RX ADMIN — CELECOXIB 200 MILLIGRAM(S): 200 CAPSULE ORAL at 17:04

## 2019-10-01 RX ADMIN — Medication 1 TABLET(S): at 14:00

## 2019-10-02 RX ORDER — CIPROFLOXACIN LACTATE 400MG/40ML
250 VIAL (ML) INTRAVENOUS
Refills: 0 | Status: DISCONTINUED | OUTPATIENT
Start: 2019-10-02 | End: 2019-10-03

## 2019-10-02 RX ADMIN — Medication 1 TABLET(S): at 12:04

## 2019-10-02 RX ADMIN — SENNA PLUS 2 TABLET(S): 8.6 TABLET ORAL at 21:24

## 2019-10-02 RX ADMIN — CELECOXIB 200 MILLIGRAM(S): 200 CAPSULE ORAL at 12:04

## 2019-10-02 RX ADMIN — SIMVASTATIN 20 MILLIGRAM(S): 20 TABLET, FILM COATED ORAL at 21:24

## 2019-10-02 RX ADMIN — ENOXAPARIN SODIUM 40 MILLIGRAM(S): 100 INJECTION SUBCUTANEOUS at 12:03

## 2019-10-02 RX ADMIN — PANTOPRAZOLE SODIUM 40 MILLIGRAM(S): 20 TABLET, DELAYED RELEASE ORAL at 05:25

## 2019-10-02 RX ADMIN — Medication 100 MILLIGRAM(S): at 17:15

## 2019-10-02 RX ADMIN — Medication 250 MILLIGRAM(S): at 17:15

## 2019-10-02 RX ADMIN — TAMSULOSIN HYDROCHLORIDE 0.4 MILLIGRAM(S): 0.4 CAPSULE ORAL at 21:24

## 2019-10-02 RX ADMIN — Medication 100 MILLIGRAM(S): at 05:24

## 2019-10-03 LAB
CULTURE RESULTS: SIGNIFICANT CHANGE UP
SPECIMEN SOURCE: SIGNIFICANT CHANGE UP

## 2019-10-03 RX ORDER — CHLORHEXIDINE GLUCONATE 213 G/1000ML
1 SOLUTION TOPICAL
Refills: 0 | Status: DISCONTINUED | OUTPATIENT
Start: 2019-10-03 | End: 2019-10-07

## 2019-10-03 RX ORDER — CIPROFLOXACIN LACTATE 400MG/40ML
250 VIAL (ML) INTRAVENOUS
Refills: 0 | Status: COMPLETED | OUTPATIENT
Start: 2019-10-03 | End: 2019-10-05

## 2019-10-03 RX ORDER — CIPROFLOXACIN LACTATE 400MG/40ML
250 VIAL (ML) INTRAVENOUS
Refills: 0 | Status: DISCONTINUED | OUTPATIENT
Start: 2019-10-03 | End: 2019-10-03

## 2019-10-03 RX ADMIN — Medication 250 MILLIGRAM(S): at 06:06

## 2019-10-03 RX ADMIN — SENNA PLUS 2 TABLET(S): 8.6 TABLET ORAL at 21:26

## 2019-10-03 RX ADMIN — SIMVASTATIN 20 MILLIGRAM(S): 20 TABLET, FILM COATED ORAL at 21:27

## 2019-10-03 RX ADMIN — PANTOPRAZOLE SODIUM 40 MILLIGRAM(S): 20 TABLET, DELAYED RELEASE ORAL at 06:06

## 2019-10-03 RX ADMIN — Medication 1 TABLET(S): at 12:26

## 2019-10-03 RX ADMIN — AMLODIPINE BESYLATE 2.5 MILLIGRAM(S): 2.5 TABLET ORAL at 06:06

## 2019-10-03 RX ADMIN — Medication 100 MILLIGRAM(S): at 06:06

## 2019-10-03 RX ADMIN — TAMSULOSIN HYDROCHLORIDE 0.4 MILLIGRAM(S): 0.4 CAPSULE ORAL at 21:26

## 2019-10-03 RX ADMIN — CELECOXIB 200 MILLIGRAM(S): 200 CAPSULE ORAL at 12:27

## 2019-10-03 RX ADMIN — CELECOXIB 200 MILLIGRAM(S): 200 CAPSULE ORAL at 12:26

## 2019-10-03 RX ADMIN — Medication 250 MILLIGRAM(S): at 17:03

## 2019-10-04 RX ADMIN — TAMSULOSIN HYDROCHLORIDE 0.4 MILLIGRAM(S): 0.4 CAPSULE ORAL at 21:45

## 2019-10-04 RX ADMIN — Medication 250 MILLIGRAM(S): at 05:50

## 2019-10-04 RX ADMIN — SIMVASTATIN 20 MILLIGRAM(S): 20 TABLET, FILM COATED ORAL at 21:45

## 2019-10-04 RX ADMIN — Medication 1 TABLET(S): at 12:32

## 2019-10-04 RX ADMIN — Medication 250 MILLIGRAM(S): at 17:32

## 2019-10-04 RX ADMIN — CHLORHEXIDINE GLUCONATE 1 APPLICATION(S): 213 SOLUTION TOPICAL at 05:50

## 2019-10-04 RX ADMIN — Medication 100 MILLIGRAM(S): at 17:32

## 2019-10-04 RX ADMIN — AMLODIPINE BESYLATE 2.5 MILLIGRAM(S): 2.5 TABLET ORAL at 05:50

## 2019-10-04 RX ADMIN — CELECOXIB 200 MILLIGRAM(S): 200 CAPSULE ORAL at 12:32

## 2019-10-04 RX ADMIN — Medication 100 MILLIGRAM(S): at 05:50

## 2019-10-04 RX ADMIN — PANTOPRAZOLE SODIUM 40 MILLIGRAM(S): 20 TABLET, DELAYED RELEASE ORAL at 06:51

## 2019-10-04 RX ADMIN — CELECOXIB 200 MILLIGRAM(S): 200 CAPSULE ORAL at 13:53

## 2019-10-05 RX ADMIN — PANTOPRAZOLE SODIUM 40 MILLIGRAM(S): 20 TABLET, DELAYED RELEASE ORAL at 05:47

## 2019-10-05 RX ADMIN — CELECOXIB 200 MILLIGRAM(S): 200 CAPSULE ORAL at 12:32

## 2019-10-05 RX ADMIN — SENNA PLUS 2 TABLET(S): 8.6 TABLET ORAL at 21:32

## 2019-10-05 RX ADMIN — AMLODIPINE BESYLATE 2.5 MILLIGRAM(S): 2.5 TABLET ORAL at 05:47

## 2019-10-05 RX ADMIN — SIMVASTATIN 20 MILLIGRAM(S): 20 TABLET, FILM COATED ORAL at 21:32

## 2019-10-05 RX ADMIN — CELECOXIB 200 MILLIGRAM(S): 200 CAPSULE ORAL at 16:58

## 2019-10-05 RX ADMIN — Medication 250 MILLIGRAM(S): at 17:16

## 2019-10-05 RX ADMIN — Medication 1 TABLET(S): at 12:32

## 2019-10-05 RX ADMIN — Medication 100 MILLIGRAM(S): at 05:47

## 2019-10-05 RX ADMIN — Medication 100 MILLIGRAM(S): at 17:16

## 2019-10-05 RX ADMIN — TAMSULOSIN HYDROCHLORIDE 0.4 MILLIGRAM(S): 0.4 CAPSULE ORAL at 21:32

## 2019-10-05 RX ADMIN — CHLORHEXIDINE GLUCONATE 1 APPLICATION(S): 213 SOLUTION TOPICAL at 05:48

## 2019-10-05 RX ADMIN — Medication 250 MILLIGRAM(S): at 05:47

## 2019-10-06 RX ADMIN — CELECOXIB 200 MILLIGRAM(S): 200 CAPSULE ORAL at 12:22

## 2019-10-06 RX ADMIN — Medication 100 MILLIGRAM(S): at 05:13

## 2019-10-06 RX ADMIN — CELECOXIB 200 MILLIGRAM(S): 200 CAPSULE ORAL at 12:24

## 2019-10-06 RX ADMIN — Medication 100 MILLIGRAM(S): at 17:08

## 2019-10-06 RX ADMIN — PANTOPRAZOLE SODIUM 40 MILLIGRAM(S): 20 TABLET, DELAYED RELEASE ORAL at 05:14

## 2019-10-06 RX ADMIN — CHLORHEXIDINE GLUCONATE 1 APPLICATION(S): 213 SOLUTION TOPICAL at 05:14

## 2019-10-06 RX ADMIN — SENNA PLUS 2 TABLET(S): 8.6 TABLET ORAL at 21:34

## 2019-10-06 RX ADMIN — AMLODIPINE BESYLATE 2.5 MILLIGRAM(S): 2.5 TABLET ORAL at 05:13

## 2019-10-06 RX ADMIN — Medication 1 TABLET(S): at 12:24

## 2019-10-06 RX ADMIN — SIMVASTATIN 20 MILLIGRAM(S): 20 TABLET, FILM COATED ORAL at 21:34

## 2019-10-06 RX ADMIN — TAMSULOSIN HYDROCHLORIDE 0.4 MILLIGRAM(S): 0.4 CAPSULE ORAL at 21:34

## 2019-10-07 ENCOUNTER — TRANSCRIPTION ENCOUNTER (OUTPATIENT)
Age: 69
End: 2019-10-07

## 2019-10-07 LAB
ALBUMIN SERPL ELPH-MCNC: 3.8 G/DL — SIGNIFICANT CHANGE UP (ref 3.5–5.2)
ALP SERPL-CCNC: 230 U/L — HIGH (ref 30–115)
ALT FLD-CCNC: 68 U/L — HIGH (ref 0–41)
ANION GAP SERPL CALC-SCNC: 11 MMOL/L — SIGNIFICANT CHANGE UP (ref 7–14)
AST SERPL-CCNC: 34 U/L — SIGNIFICANT CHANGE UP (ref 0–41)
BILIRUB SERPL-MCNC: 0.4 MG/DL — SIGNIFICANT CHANGE UP (ref 0.2–1.2)
BUN SERPL-MCNC: 15 MG/DL — SIGNIFICANT CHANGE UP (ref 10–20)
CALCIUM SERPL-MCNC: 9.5 MG/DL — SIGNIFICANT CHANGE UP (ref 8.5–10.1)
CHLORIDE SERPL-SCNC: 101 MMOL/L — SIGNIFICANT CHANGE UP (ref 98–110)
CO2 SERPL-SCNC: 29 MMOL/L — SIGNIFICANT CHANGE UP (ref 17–32)
CREAT SERPL-MCNC: 0.6 MG/DL — LOW (ref 0.7–1.5)
GLUCOSE SERPL-MCNC: 135 MG/DL — HIGH (ref 70–99)
HCT VFR BLD CALC: 31.3 % — LOW (ref 37–47)
HGB BLD-MCNC: 9.8 G/DL — LOW (ref 12–16)
MAGNESIUM SERPL-MCNC: 2.2 MG/DL — SIGNIFICANT CHANGE UP (ref 1.8–2.4)
MCHC RBC-ENTMCNC: 29.1 PG — SIGNIFICANT CHANGE UP (ref 27–31)
MCHC RBC-ENTMCNC: 31.3 G/DL — LOW (ref 32–37)
MCV RBC AUTO: 92.9 FL — SIGNIFICANT CHANGE UP (ref 81–99)
NRBC # BLD: 0 /100 WBCS — SIGNIFICANT CHANGE UP (ref 0–0)
PLATELET # BLD AUTO: 688 K/UL — HIGH (ref 130–400)
POTASSIUM SERPL-MCNC: 4.6 MMOL/L — SIGNIFICANT CHANGE UP (ref 3.5–5)
POTASSIUM SERPL-SCNC: 4.6 MMOL/L — SIGNIFICANT CHANGE UP (ref 3.5–5)
PROT SERPL-MCNC: 6.4 G/DL — SIGNIFICANT CHANGE UP (ref 6–8)
RBC # BLD: 3.37 M/UL — LOW (ref 4.2–5.4)
RBC # FLD: 13.8 % — SIGNIFICANT CHANGE UP (ref 11.5–14.5)
SODIUM SERPL-SCNC: 141 MMOL/L — SIGNIFICANT CHANGE UP (ref 135–146)
WBC # BLD: 10.6 K/UL — SIGNIFICANT CHANGE UP (ref 4.8–10.8)
WBC # FLD AUTO: 10.6 K/UL — SIGNIFICANT CHANGE UP (ref 4.8–10.8)

## 2019-10-07 RX ORDER — ASPIRIN/CALCIUM CARB/MAGNESIUM 324 MG
1 TABLET ORAL
Qty: 60 | Refills: 0
Start: 2019-10-07 | End: 2019-11-05

## 2019-10-07 RX ORDER — SIMVASTATIN 20 MG/1
1 TABLET, FILM COATED ORAL
Qty: 30 | Refills: 0
Start: 2019-10-07 | End: 2019-11-05

## 2019-10-07 RX ORDER — PANTOPRAZOLE SODIUM 20 MG/1
1 TABLET, DELAYED RELEASE ORAL
Qty: 0 | Refills: 0 | DISCHARGE
Start: 2019-10-07

## 2019-10-07 RX ORDER — TAMSULOSIN HYDROCHLORIDE 0.4 MG/1
1 CAPSULE ORAL
Qty: 30 | Refills: 0
Start: 2019-10-07 | End: 2019-11-05

## 2019-10-07 RX ORDER — BETHANECHOL CHLORIDE 25 MG
1 TABLET ORAL
Qty: 90 | Refills: 0
Start: 2019-10-07 | End: 2019-11-05

## 2019-10-07 RX ORDER — LANOLIN ALCOHOL/MO/W.PET/CERES
1 CREAM (GRAM) TOPICAL
Qty: 0 | Refills: 0 | DISCHARGE
Start: 2019-10-07

## 2019-10-07 RX ORDER — AMLODIPINE BESYLATE 2.5 MG/1
1 TABLET ORAL
Qty: 30 | Refills: 0
Start: 2019-10-07 | End: 2019-11-05

## 2019-10-07 RX ORDER — AMLODIPINE BESYLATE 2.5 MG/1
0 TABLET ORAL
Qty: 90 | Refills: 0 | DISCHARGE

## 2019-10-07 RX ORDER — SIMVASTATIN 20 MG/1
0 TABLET, FILM COATED ORAL
Qty: 90 | Refills: 0 | DISCHARGE

## 2019-10-07 RX ADMIN — CHLORHEXIDINE GLUCONATE 1 APPLICATION(S): 213 SOLUTION TOPICAL at 05:27

## 2019-10-07 RX ADMIN — CELECOXIB 200 MILLIGRAM(S): 200 CAPSULE ORAL at 11:37

## 2019-10-07 RX ADMIN — Medication 100 MILLIGRAM(S): at 05:26

## 2019-10-07 RX ADMIN — PANTOPRAZOLE SODIUM 40 MILLIGRAM(S): 20 TABLET, DELAYED RELEASE ORAL at 05:26

## 2019-10-07 RX ADMIN — Medication 1 TABLET(S): at 11:37

## 2019-10-07 NOTE — DISCHARGE NOTE PROVIDER - NSDCCPCAREPLAN_GEN_ALL_CORE_FT
PRINCIPAL DISCHARGE DIAGNOSIS  Diagnosis: Traumatic closed displaced fracture of neck of femur  Assessment and Plan of Treatment: s/p hip hemiarthroplasty , continue home physical therapy  and ortho follow up in 2 weeks ,      SECONDARY DISCHARGE DIAGNOSES  Diagnosis: Urine retention  Assessment and Plan of Treatment: sapp reinserted today  for retention , on flomax added urecholine , please follow up with urologist in  a  week    Diagnosis: Pneumothorax  Assessment and Plan of Treatment: given chest tube with improvement , she needs to folllow up with dr deng in 2 weeks  . PRINCIPAL DISCHARGE DIAGNOSIS  Diagnosis: Traumatic closed displaced fracture of neck of femur  Assessment and Plan of Treatment: s/p hip hemiarthroplasty  on 9/23 by dr Sully hardy , continue home physical therapy  and ortho follow up in 2 weeks ,      SECONDARY DISCHARGE DIAGNOSES  Diagnosis: Urine retention  Assessment and Plan of Treatment: sapp reinserted today  for retention , on flomax added urecholine , please follow up with urologist in  a  week    Diagnosis: Pneumothorax  Assessment and Plan of Treatment: given chest tube with improvement , she needs to folllow up with dr deng in 2 weeks  .

## 2019-10-07 NOTE — DISCHARGE NOTE NURSING/CASE MANAGEMENT/SOCIAL WORK - PATIENT PORTAL LINK FT
You can access the FollowMyHealth Patient Portal offered by [facility] by registering at the following website: [email or web address]. By joining [facility]’s FollowMyHealth portal, you will also be able to view your health information using other applications (apps) compatible with our system.

## 2019-10-07 NOTE — DISCHARGE NOTE PROVIDER - CARE PROVIDERS DIRECT ADDRESSES
,[email or web address],DirectAddress_Unknown,DirectAddress_Unknown

## 2019-10-07 NOTE — DISCHARGE NOTE PROVIDER - HOSPITAL COURSE
Hospital Course		    68F, PMHx HTN, HLD, presented to ED s/p trip and fall, -HT, -LOC, -AC, w/ L hip pain. Imaging showed Left subcapital femoral neck fracture and left pneumothorax and chest xray. Pt had a chest tube placed in the ED and CT showed large bleb disease. Pt was admitted to surgery service. Pt went to the OR for Left total hip arthroplasty. Pt tolerated procedure well, pain well controlled, WBAT, tolerated advancement in diet. Chest tube was removed before discharge, patient denied SOB or CP, or trouble breathing. Pt was evaluated by physical therapy and rehab which recommended  rehab. she was  discharged to  on 9/25/19   she participated in rehab therapy  for 3 hours daily , she is able to ambulate with rolling walker  upto 150 feet , her chest tube site healed well , remaining 1 suture removed , she needs to folllow up with dr Llamas in 2 weeks , and dr Collins in 2 weeks. she initially had morethan 1300 cc urine retention while on trauma floor . she had sapp inserted by urology for difficult  sapp insertion in  rehab , and after starting flomax for a week  trial of void attempted again on 10/4 , failed again and  needing  straight cath , on 10/7 sapp reinserted for pvr of 600 or more , since she is being discharged home. She needs to  follow up with urologist in 1 week sr Steel or dr Salazar.. Hospital Course		    68F, PMHx HTN, HLD, presented to ED s/p trip and fall, -HT, -LOC, -AC, w/ L hip pain. Imaging showed Left subcapital femoral neck fracture and left pneumothorax and chest xray. Pt had a chest tube placed in the ED and CT showed large bleb disease. Pt was admitted to surgery service. Pt went to the OR for Left total hip arthroplasty on 9/23/19. Pt tolerated procedure well, pain well controlled, WBAT, tolerated advancement in diet. Chest tube was removed before discharge, patient denied SOB or CP, or trouble breathing. Pt was evaluated by physical therapy and rehab which recommended  rehab. she was  discharged to  on 9/25/19   she participated in rehab therapy  for 3 hours daily , she is able to ambulate with rolling walker  upto 150 feet , her chest tube site healed well , remaining 1 suture removed , she needs to folllow up with dr Llamas in 2 weeks , and dr Collins in 2 weeks. she initially had morethan 1300 cc urine retention while on trauma floor . she had sapp inserted by urology for difficult  sapp insertion in  rehab , and after starting flomax for a week  trial of void attempted again on 10/4 , failed again and  needing  straight cath , on 10/7 sapp reinserted for pvr of 600 or more , since she is being discharged home. She needs to  follow up with urologist in 1 week sr Steel or dr Salazar.. Hospital Course		    68F, PMHx HTN, HLD, presented to ED s/p trip and fall, she  was found to have  Left subcapital femoral neck fracture and left pneumothorax  in chest xray. Pt had a chest tube placed in the ED . Pt was admitted to surgery service. Pt went to the OR for Left total hip arthroplasty on 9/23/19. Pt tolerated procedure well, pain well controlled, WBAT, tolerated advancement in diet. Chest tube was removed before discharge, patient denied SOB or CP, or trouble breathing. Pt was evaluated by physical therapy and rehab ,  recommended  rehab. she was  discharged to  on 9/25/19   she participated in rehab therapy  for 3 hours daily , she is able to ambulate with rolling walker  upto 150 feet , her chest tube site healed well , remaining 1 suture removed , she needs to folllow up with dr Llamas in 2 weeks , and dr Collins in 2 weeks. she initially had more than 1300 cc urine retention while on trauma floor . she had sapp inserted by urology for difficult  with  sapp insertion in  rehab , and after starting flomax for a week  trial of void attempted again on 10/4 , failed again and  needing  straight cath , on 10/7 sapp reinserted for pvr of 600 or more , since she is being discharged home. She  is advised to   follow up with urologist in 1 week  dr Steel or Josh Santizo .

## 2019-10-07 NOTE — DISCHARGE NOTE PROVIDER - REASON FOR ADMISSION
multitrauma , left femur and rib fractures with s/p  chest tube  for pneumothorax multitrauma , left femur  fractures , s/p left hemiarthroplasty with s/p  chest tube  for pneumothorax

## 2019-10-07 NOTE — DISCHARGE NOTE PROVIDER - PROVIDER TOKENS
PROVIDER:[TOKEN:[[id number]]],PROVIDER:[TOKEN:[[id number]]],PROVIDER:[TOKEN:[[id number]]]

## 2019-10-07 NOTE — DISCHARGE NOTE PROVIDER - CARE PROVIDER_API CALL
Soy Deluna)  Orthopaedic Surgery  [address] [place] [address]  Phone: [phone]  Fax: [phone]  Follow Up Time:     Enrrique Llamas)  Surgery; Thoracic Surgery  [address] [place] [address], [address]  [place] [address]  Phone: [phone]  Fax: [phone]  Follow Up Time:     Josh Carney)  Urology  [address] [place] [address], [place] [address]  Phone: [phone]  Fax: [phone]  Follow Up Time:

## 2019-10-16 DIAGNOSIS — Z96.642 PRESENCE OF LEFT ARTIFICIAL HIP JOINT: ICD-10-CM

## 2019-10-16 DIAGNOSIS — Z79.01 LONG TERM (CURRENT) USE OF ANTICOAGULANTS: ICD-10-CM

## 2019-10-16 DIAGNOSIS — K59.00 CONSTIPATION, UNSPECIFIED: ICD-10-CM
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
## 2019-10-16 DIAGNOSIS — S27.0XXD TRAUMATIC PNEUMOTHORAX, SUBSEQUENT ENCOUNTER: ICD-10-CM

## 2019-10-16 DIAGNOSIS — E78.5 HYPERLIPIDEMIA, UNSPECIFIED: ICD-10-CM

## 2019-10-16 DIAGNOSIS — S72.012D UNSPECIFIED INTRACAPSULAR FRACTURE OF LEFT FEMUR, SUBSEQUENT ENCOUNTER FOR CLOSED FRACTURE WITH ROUTINE HEALING: ICD-10-CM

## 2019-10-16 DIAGNOSIS — I10 ESSENTIAL (PRIMARY) HYPERTENSION: ICD-10-CM

## 2019-10-16 DIAGNOSIS — R33.9 RETENTION OF URINE, UNSPECIFIED: ICD-10-CM
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
## 2022-05-06 PROBLEM — Z00.00 ENCOUNTER FOR PREVENTIVE HEALTH EXAMINATION: Status: ACTIVE | Noted: 2022-05-06

## 2023-03-04 ENCOUNTER — EMERGENCY (EMERGENCY)
Facility: HOSPITAL | Age: 73
LOS: 0 days | Discharge: HOME | End: 2023-03-04
Attending: EMERGENCY MEDICINE
Payer: MEDICARE

## 2023-03-04 VITALS
TEMPERATURE: 98 F | DIASTOLIC BLOOD PRESSURE: 89 MMHG | HEART RATE: 86 BPM | WEIGHT: 89.07 LBS | RESPIRATION RATE: 20 BRPM | OXYGEN SATURATION: 98 % | SYSTOLIC BLOOD PRESSURE: 185 MMHG

## 2023-03-04 DIAGNOSIS — I10 ESSENTIAL (PRIMARY) HYPERTENSION: ICD-10-CM

## 2023-03-04 DIAGNOSIS — M41.84 OTHER FORMS OF SCOLIOSIS, THORACIC REGION: ICD-10-CM

## 2023-03-04 DIAGNOSIS — R05.8 OTHER SPECIFIED COUGH: ICD-10-CM

## 2023-03-04 DIAGNOSIS — M19.90 UNSPECIFIED OSTEOARTHRITIS, UNSPECIFIED SITE: ICD-10-CM

## 2023-03-04 DIAGNOSIS — Z87.81 PERSONAL HISTORY OF (HEALED) TRAUMATIC FRACTURE: ICD-10-CM

## 2023-03-04 DIAGNOSIS — Z20.822 CONTACT WITH AND (SUSPECTED) EXPOSURE TO COVID-19: ICD-10-CM

## 2023-03-04 DIAGNOSIS — Z97.8 PRESENCE OF OTHER SPECIFIED DEVICES: ICD-10-CM

## 2023-03-04 DIAGNOSIS — J93.83 OTHER PNEUMOTHORAX: ICD-10-CM

## 2023-03-04 DIAGNOSIS — E78.00 PURE HYPERCHOLESTEROLEMIA, UNSPECIFIED: ICD-10-CM

## 2023-03-04 LAB
ALBUMIN SERPL ELPH-MCNC: 4.8 G/DL — SIGNIFICANT CHANGE UP (ref 3.5–5.2)
ALP SERPL-CCNC: 79 U/L — SIGNIFICANT CHANGE UP (ref 30–115)
ALT FLD-CCNC: 13 U/L — SIGNIFICANT CHANGE UP (ref 0–41)
ANION GAP SERPL CALC-SCNC: 12 MMOL/L — SIGNIFICANT CHANGE UP (ref 7–14)
AST SERPL-CCNC: 21 U/L — SIGNIFICANT CHANGE UP (ref 0–41)
BASE EXCESS BLDV CALC-SCNC: 4.2 MMOL/L — HIGH (ref -2–3)
BASOPHILS # BLD AUTO: 0.03 K/UL — SIGNIFICANT CHANGE UP (ref 0–0.2)
BASOPHILS NFR BLD AUTO: 0.4 % — SIGNIFICANT CHANGE UP (ref 0–1)
BILIRUB SERPL-MCNC: 0.4 MG/DL — SIGNIFICANT CHANGE UP (ref 0.2–1.2)
BUN SERPL-MCNC: 10 MG/DL — SIGNIFICANT CHANGE UP (ref 10–20)
CA-I SERPL-SCNC: 1.21 MMOL/L — SIGNIFICANT CHANGE UP (ref 1.15–1.33)
CALCIUM SERPL-MCNC: 9.8 MG/DL — SIGNIFICANT CHANGE UP (ref 8.4–10.5)
CHLORIDE SERPL-SCNC: 104 MMOL/L — SIGNIFICANT CHANGE UP (ref 98–110)
CO2 SERPL-SCNC: 26 MMOL/L — SIGNIFICANT CHANGE UP (ref 17–32)
CREAT SERPL-MCNC: 0.6 MG/DL — LOW (ref 0.7–1.5)
EGFR: 95 ML/MIN/1.73M2 — SIGNIFICANT CHANGE UP
EOSINOPHIL # BLD AUTO: 0.05 K/UL — SIGNIFICANT CHANGE UP (ref 0–0.7)
EOSINOPHIL NFR BLD AUTO: 0.6 % — SIGNIFICANT CHANGE UP (ref 0–8)
FLUAV AG NPH QL: SIGNIFICANT CHANGE UP
FLUBV AG NPH QL: SIGNIFICANT CHANGE UP
GAS PNL BLDV: 138 MMOL/L — SIGNIFICANT CHANGE UP (ref 136–145)
GAS PNL BLDV: SIGNIFICANT CHANGE UP
GLUCOSE SERPL-MCNC: 103 MG/DL — HIGH (ref 70–99)
HCO3 BLDV-SCNC: 30 MMOL/L — HIGH (ref 22–29)
HCT VFR BLD CALC: 42 % — SIGNIFICANT CHANGE UP (ref 37–47)
HCT VFR BLDA CALC: 42 % — SIGNIFICANT CHANGE UP (ref 39–51)
HGB BLD CALC-MCNC: 14 G/DL — SIGNIFICANT CHANGE UP (ref 12.6–17.4)
HGB BLD-MCNC: 13.8 G/DL — SIGNIFICANT CHANGE UP (ref 12–16)
IMM GRANULOCYTES NFR BLD AUTO: 0.4 % — HIGH (ref 0.1–0.3)
LACTATE BLDV-MCNC: 1.5 MMOL/L — SIGNIFICANT CHANGE UP (ref 0.5–2)
LYMPHOCYTES # BLD AUTO: 1.15 K/UL — LOW (ref 1.2–3.4)
LYMPHOCYTES # BLD AUTO: 14.8 % — LOW (ref 20.5–51.1)
MCHC RBC-ENTMCNC: 28.6 PG — SIGNIFICANT CHANGE UP (ref 27–31)
MCHC RBC-ENTMCNC: 32.9 G/DL — SIGNIFICANT CHANGE UP (ref 32–37)
MCV RBC AUTO: 87.1 FL — SIGNIFICANT CHANGE UP (ref 81–99)
MONOCYTES # BLD AUTO: 0.39 K/UL — SIGNIFICANT CHANGE UP (ref 0.1–0.6)
MONOCYTES NFR BLD AUTO: 5 % — SIGNIFICANT CHANGE UP (ref 1.7–9.3)
NEUTROPHILS # BLD AUTO: 6.14 K/UL — SIGNIFICANT CHANGE UP (ref 1.4–6.5)
NEUTROPHILS NFR BLD AUTO: 78.8 % — HIGH (ref 42.2–75.2)
NRBC # BLD: 0 /100 WBCS — SIGNIFICANT CHANGE UP (ref 0–0)
NT-PROBNP SERPL-SCNC: 220 PG/ML — SIGNIFICANT CHANGE UP (ref 0–300)
PCO2 BLDV: 47 MMHG — HIGH (ref 39–42)
PH BLDV: 7.41 — SIGNIFICANT CHANGE UP (ref 7.32–7.43)
PLATELET # BLD AUTO: 393 K/UL — SIGNIFICANT CHANGE UP (ref 130–400)
PO2 BLDV: 17 MMHG — SIGNIFICANT CHANGE UP
POTASSIUM BLDV-SCNC: 4.5 MMOL/L — SIGNIFICANT CHANGE UP (ref 3.5–5.1)
POTASSIUM SERPL-MCNC: 4.8 MMOL/L — SIGNIFICANT CHANGE UP (ref 3.5–5)
POTASSIUM SERPL-SCNC: 4.8 MMOL/L — SIGNIFICANT CHANGE UP (ref 3.5–5)
PROT SERPL-MCNC: 7.8 G/DL — SIGNIFICANT CHANGE UP (ref 6–8)
RBC # BLD: 4.82 M/UL — SIGNIFICANT CHANGE UP (ref 4.2–5.4)
RBC # FLD: 13.2 % — SIGNIFICANT CHANGE UP (ref 11.5–14.5)
RSV RNA NPH QL NAA+NON-PROBE: SIGNIFICANT CHANGE UP
SAO2 % BLDV: 24 % — SIGNIFICANT CHANGE UP
SARS-COV-2 RNA SPEC QL NAA+PROBE: SIGNIFICANT CHANGE UP
SODIUM SERPL-SCNC: 142 MMOL/L — SIGNIFICANT CHANGE UP (ref 135–146)
WBC # BLD: 7.79 K/UL — SIGNIFICANT CHANGE UP (ref 4.8–10.8)
WBC # FLD AUTO: 7.79 K/UL — SIGNIFICANT CHANGE UP (ref 4.8–10.8)

## 2023-03-04 PROCEDURE — 85018 HEMOGLOBIN: CPT

## 2023-03-04 PROCEDURE — 36415 COLL VENOUS BLD VENIPUNCTURE: CPT

## 2023-03-04 PROCEDURE — 99285 EMERGENCY DEPT VISIT HI MDM: CPT

## 2023-03-04 PROCEDURE — 83605 ASSAY OF LACTIC ACID: CPT

## 2023-03-04 PROCEDURE — 0241U: CPT

## 2023-03-04 PROCEDURE — 85025 COMPLETE CBC W/AUTO DIFF WBC: CPT

## 2023-03-04 PROCEDURE — 83880 ASSAY OF NATRIURETIC PEPTIDE: CPT

## 2023-03-04 PROCEDURE — 93005 ELECTROCARDIOGRAM TRACING: CPT

## 2023-03-04 PROCEDURE — 85014 HEMATOCRIT: CPT

## 2023-03-04 PROCEDURE — 80053 COMPREHEN METABOLIC PANEL: CPT

## 2023-03-04 PROCEDURE — 93010 ELECTROCARDIOGRAM REPORT: CPT

## 2023-03-04 PROCEDURE — 71045 X-RAY EXAM CHEST 1 VIEW: CPT | Mod: 26

## 2023-03-04 PROCEDURE — 71045 X-RAY EXAM CHEST 1 VIEW: CPT

## 2023-03-04 PROCEDURE — 82803 BLOOD GASES ANY COMBINATION: CPT

## 2023-03-04 PROCEDURE — 84132 ASSAY OF SERUM POTASSIUM: CPT

## 2023-03-04 PROCEDURE — 84295 ASSAY OF SERUM SODIUM: CPT

## 2023-03-04 PROCEDURE — 99285 EMERGENCY DEPT VISIT HI MDM: CPT | Mod: 25

## 2023-03-04 PROCEDURE — 82330 ASSAY OF CALCIUM: CPT

## 2023-03-04 NOTE — ED PROVIDER NOTE - CLINICAL SUMMARY MEDICAL DECISION MAKING FREE TEXT BOX
72-year-old female past medical history of hypertension, dyslipidemia, scolosis, prior pneumothorax with a chest tube, prior hip fracture presents to the emergency department referred from urgent care for a large left-sided pneumothorax.  Patient initially went to urgent care after having a dry cough for the past couple of weeks.  Patient denies any chest pain, shortness of breath, leg pain, calf pain, leg swelling.  Patient is eating and drinking normally.  Patient denies any fever or chills.    On exam, vital signs reviewed.  Patient is a thin elderly female but is in not in respiratory distress.  Patient not on supplemental oxygen.  Lung exam shows decreased air entry to left lung.  Patient has normal heart sounds and normal abdominal exam and no calf tenderness.  Gross neurological exam is unremarkable.  Patient had chest x-ray done that shows large left pneumothorax.  Patient's not had any x-rays of her chest since 2019 during her hospitalization.  I reviewed these previous films and patient had an identical pneumothorax at that time.  Patient had chest tube placement at this time and it was not successful and the chest tube was removed and last x-ray looks very similar to 1 today.  With radiology review reviewed CT scan and patient appeared to have a loculated large pneumothorax and chest tube was not in the large loculation to reexpand the lung.  Patient spoken to about these results and how x-rays likely the same as 2019.  Though this is a pneumothorax patient is very stable and we cannot ensure patient will have reexpansion of lung.  Patient spoken to and offered chest tube placement in the emergency department.  Patient would like to defer and follow-up closely with pulmonology and get recommendations/evaluation.  Given timeline of how long patient had a pneumothorax, I believe this is appropriate and strict return precautions discussed with patient and son.  Patient and son aware they may be referred right back to the emergency department for chest tube placement by pulmonary and they are on board with this plan.    Full DC instructions discussed and patient knows when to seek immediate medical attention.  Patient has proper follow up.  All results discussed and patient aware they may require further work up.  Proper follow up ensured. Limitations of ED work up discussed.  Medications administered and prescribed/OTC home meds discussed.  All questions and concerns from patient or family addressed. Understanding of instructions verbalized.

## 2023-03-04 NOTE — ED ADULT NURSE NOTE - NSFALLRSKUNASSIST_ED_ALL_ED
MEDICINE, PROGRESS NOTE [phone]    COSTEN, SANDRA 59y [id number]    Patient seen and examined.  Patient is a 59y old  Female who presents with a chief complaint of shortness of breath (11 Oct 2017 22:01)  Pt feels ok. Left leg swelling is improving with wrap.    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)  Calf DVT (Deep Venous Thrombosis) (ICD9 453.42)  Adenomatous Goiter (ICD9 241.9)  Chronic Gout (ICD9 274.02)  LBBB (Left Bundle Branch Block) (ICD9 426.3)  CHF (Congestive Heart Failure) (ICD9 428.0)  Hx of aorta-iliac-femoral bypass  TOP (Termination of Pregnancy)  S/P Dilatation and Curettage  History of Tubal Ligation  s/p AAA (Abdominal Aortic Aneurysm) Repair: 2009 2010  History of Cholecystectomy  S/P Partial Hysterectomy    MEDICATIONS  (STANDING):  amLODIPine   Tablet 10 milliGRAM(s) Oral daily  AQUAPHOR (petrolatum Ointment) 1 Application(s) Topical two times a day  argatroban Infusion 2 MICROgram(s)/kG/Min (15.192 mL/Hr) IV Continuous <Continuous>  ascorbic acid 500 milliGRAM(s) Oral daily  carvedilol 25 milliGRAM(s) Oral every 12 hours  ferrous    sulfate 325 milliGRAM(s) Oral daily  furosemide   Injectable 60 milliGRAM(s) IV Push daily  hydrALAZINE 25 milliGRAM(s) Oral every 8 hours  influenza   Vaccine 0.5 milliLiter(s) IntraMuscular once  labetalol 200 milliGRAM(s) Oral every 8 hours  Nephro-alyse 1 Tablet(s) Oral daily  pantoprazole    Tablet 40 milliGRAM(s) Oral before breakfast  predniSONE   Tablet 10 milliGRAM(s) Oral daily  sucralfate suspension 1 Gram(s) Oral three times a day    MEDICATIONS  (PRN):    Allergies    heparin (Unknown)  losartan (Anaphylaxis)  nitroglycerin (Other)    Intolerances        PHYSICAL EXAM:  Constitutional: NAD  HEENT: Normocephalic, EOMI  Neck:  No JVD  Respiratory: CTA B/L, No wheezes  Cardiovascular: S1, S2, RRR, + systolic murmur  Gastrointestinal: BS+, soft, NT/ND  Extremities: + peripheral edema LE b/l, decreased size of left leg  Neurological: AAOX3, no focal deficits  Psychiatric: Normal mood, normal affect  : + Hook    Vital Signs Last 24 Hrs  T(C): 36.6 (04 Nov 2017 04:31), Max: 36.8 (03 Nov 2017 21:25)  T(F): 97.8 (04 Nov 2017 04:31), Max: 98.2 (03 Nov 2017 21:25)  HR: 85 (04 Nov 2017 13:14) (77 - 87)  BP: 133/72 (04 Nov 2017 13:14) (133/72 - 168/88)  BP(mean): --  RR: 18 (04 Nov 2017 04:31) (18 - 18)  SpO2: 97% (04 Nov 2017 04:31) (97% - 98%)  I&O's Summary    03 Nov 2017 07:01  -  04 Nov 2017 07:00  --------------------------------------------------------  IN: 1020 mL / OUT: 4900 mL / NET: -3880 mL    04 Nov 2017 08:01  -  04 Nov 2017 14:35  --------------------------------------------------------  IN: 106.4 mL / OUT: 1600 mL / NET: -1493.6 mL        LABS:                        10.7   6.0   )-----------( 164      ( 04 Nov 2017 10:20 )             33.4     11-04    146<H>  |  104  |  65<H>  ----------------------------<  108<H>  3.8   |  31  |  2.59<H>    Ca    9.4      04 Nov 2017 10:20 no

## 2023-03-04 NOTE — ED PROVIDER NOTE - WR INTERPRETATION 1
Large left sided pneumothroax. When compared to x-rays and CT scan from 2019, Pneumothorax is similiar/minimally improved. No infiltrates.

## 2023-03-04 NOTE — ED PROVIDER NOTE - NSFOLLOWUPINSTRUCTIONS_ED_ALL_ED_FT
[Patient] : patient [] :  [[employer] ] : [employer]   [TWNoteComboBox1] : [place] [FreeTextEntry1] : [id number] You were seen and evaluated after you had an outpatient x-ray there was concern for collapsed lung on the left side.  You were seen here in 2019 after a trauma and was found to have a pneumothorax.  He had a chest tube placed that did not resolve the pneumothorax and you were discharged from the hospital.  He did not follow-up with any providers.  Your x-ray today showed a slightly improved, yet still large left pneumothorax.  This was compared to your prior CT scan.  There are areas of pneumothorax or loculated and after discussing this with radiology it is possible that you could have another chest tube placed into the large loculation to possibly reexpand your lung.  You have had this condition for 4 years and you are opting to follow-up with pulmonary and do not want a chest tube at this time.  We discussed how pneumothorax can get worse, though having the same pneumothorax for 4 years it is likely stable.  Please monitor your symptoms and return immediately for any shortness of breath.  As discussed, the pulmonologist may recommend returning to the hospital for chest tube placement.  Please bring all results of follow-up and follow-up with your primary medical doctor as well.    What is a collapsed lung?  A collapsed lung, also called a pneumothorax, happens when air enters the space between your rib cage and one of your lungs. The air makes it hard for the lung to expand and causes all or part of the lung to collapse. It is then hard to breathe normally and your body gets less oxygen. A collapsed lung can be life-threatening. In some cases, the air collecting around the lung can completely collapse the lung and put pressure on the heart. Then the heart cannot pump normally.    A collapsed lung may be caused by an injury to the chest, such as a car accident, stab or bullet wound, or broken ribs. It may also be caused by lung damage from chronic lung diseases, electric shock, or near drowning. Sometimes there is no known cause for the lung to collapse. This is called a spontaneous pneumothorax.    How can I take care of myself when I go home?  How long it takes to get better depends on the cause of your collapsed lung, your treatment, how well you recover, your overall health, and any complications you may have.    Management    Your provider will give you a list of your medicines when you leave the hospital.  Know your medicines. Know what they look like, how much you should take each time, how often you should take them, and why you take each one.  Take your medicines exactly as your provider tells you to.  Carry a list of your medicines in your wallet or purse. Include any nonprescription medicines and supplements on the list.  Ask your provider if you should take aspirin. Low-dose aspirin therapy reduces the risk of stroke for women. For men, aspirin has been found to lower the risk of a first-time heart attack.  Your provider may prescribe medicine to:  Treat pain  Treat or prevent an infection  Reduce swelling in the airways  Help relax your airways  If you have had surgery, to care for your wound:  Keep your wound clean.  If you are told to change your dressing on your incision, wash your hands before changing the dressing and after disposing of the dressing.  Appointments    Follow your provider’s instructions for follow-up appointments and routine tests.  Keep appointments for all routine testing you may need.  Talk with your provider about any questions or fears you have.  Diet, Exercise, and Other Lifestyle Changes    Follow the treatment plan your healthcare provider prescribes.  Get plenty of rest while youâ€™re recovering. Try to get at least 7 to 9 hours of sleep each night.  Eat a healthy diet.  Drink enough fluids to keep your urine light yellow in color, unless you are told to limit fluids.  Exercise as your provider recommends.  Don’t smoke. Smoking can increase your risk for a collapsed lung.  Follow activity restrictions, such as not driving or operating machinery, as recommended by your healthcare provider or pharmacist, especially if you are taking pain medicines.  Call emergency medical services or 911 if you have new or worsening:    Chest pain that does not get better with medicine  Coughing up blood  Trouble breathing  Bluish or gray color of your lips or fingernails  Feeling like you are going to die  Do not drive yourself if you have any of these symptoms.    Call your healthcare provider if you have new or worsening:    Anxiety  Chest pain when you take a breath  Chills or sweats  Confusion  Coughing up mucus that is thick or blood-stained  Signs of infection around your surgical wound. These include:  The area around your wound is more red or painful  Your wound area is very warm to touch  You have blood, pus, or other fluid coming from the wound area  You have a fever higher than 101.5Â° F (38.6Â° C)  You have chills or muscle aches  Trouble breathing that wakes you at night or having shortness of breath when lying flat in bed  Wheezing

## 2023-03-04 NOTE — ED PROVIDER NOTE - NS ED ROS FT
Constitutional:  No fever, chills, lethargy, or abnormal weight loss  Eyes:  No eye pain or visual changes  ENMT: No nasal discharge, no toothache, no sore throat. No neck pain or stiffness  Cardiac:  No chest pain or palpitations  Respiratory:  + cough. No respiratory distress.   GI:  No nausea, vomiting, diarrhea or abdominal pain.  :  No dysuria, frequency or burning.  MS:  No back or joint pain.  Neuro:  No headache. No numbness, weakness, or tingling.   Skin:  No skin rash  Except as documented in the HPI,  all other systems are negative

## 2023-03-04 NOTE — ED PROVIDER NOTE - CONSIDERATION OF ADMISSION OBSERVATION
Patient was going to have chest tube placement in the emergency department, however, prior imaging reviewed and it is similar. Patient offered admission for inpatient pulmonary evaluation. Patient electing for outpatient work up with close follow up. Patient is a good candidate to attempt outpatient management. Supportive care and home care discussed in detail. Patient aware they may have to return for re-evaluation and possible admission if outpatient treatment fails. Strict return precautions discussed. Consideration of Admission/Observation

## 2023-03-04 NOTE — ED PROVIDER NOTE - CARE PROVIDER_API CALL
Sudarshan Reyes)  Critical Care Medicine; Internal Medicine; Pulmonary Disease; Sleep Medicine  [address] [place] [address]  Phone: [phone]  Fax: [phone]  Follow Up Time: 4-6 Days

## 2023-03-04 NOTE — ED PROVIDER NOTE - ATTENDING CONTRIBUTION TO CARE
I personally evaluated patient. I agree with the findings and plan with all documentation on chart except as documented  in my note.    72-year-old female past medical history of hypertension, dyslipidemia, scolosis, prior pneumothorax with a chest tube, prior hip fracture presents to the emergency department referred from urgent care for a large left-sided pneumothorax.  Patient initially went to urgent care after having a dry cough for the past couple of weeks.  Patient denies any chest pain, shortness of breath, leg pain, calf pain, leg swelling.  Patient is eating and drinking normally.  Patient denies any fever or chills.    On exam, vital signs reviewed.  Patient is a thin elderly female but is in not in respiratory distress.  Patient not on supplemental oxygen.  Lung exam shows decreased air entry to left lung.  Patient has normal heart sounds and normal abdominal exam and no calf tenderness.  Gross neurological exam is unremarkable.  Patient had chest x-ray done that shows large left pneumothorax.  Patient's not had any x-rays of her chest since 2019 during her hospitalization.  I reviewed these previous films and patient had an identical pneumothorax at that time.  Patient had chest tube placement at this time and it was not successful and the chest tube was removed and last x-ray looks very similar to 1 today.  With radiology review reviewed CT scan and patient appeared to have a loculated large pneumothorax and chest tube was not in the large loculation to reexpand the lung.  Patient spoken to about these results and how x-rays likely the same as 2019.  Though this is a pneumothorax patient is very stable and we cannot ensure patient will have reexpansion of lung.  Patient spoken to and offered chest tube placement in the emergency department.  Patient would like to defer and follow-up closely with pulmonology and get recommendations/evaluation.  Given timeline of how long patient had a pneumothorax, I believe this is appropriate and strict return precautions discussed with patient and son.  Patient and son aware they may be referred right back to the emergency department for chest tube placement by pulmonary and they are on board with this plan.    Full DC instructions discussed and patient knows when to seek immediate medical attention.  Patient has proper follow up.  All results discussed and patient aware they may require further work up.  Proper follow up ensured. Limitations of ED work up discussed.  Medications administered and prescribed/OTC home meds discussed.  All questions and concerns from patient or family addressed. Understanding of instructions verbalized.

## 2023-03-04 NOTE — ED PROVIDER NOTE - PATIENT PORTAL LINK FT
You can access the FollowMyHealth Patient Portal offered by [facility] by registering at the following website: [email or web address]. By joining [facility]’s FollowMyHealth portal, you will also be able to view your health information using other applications (apps) compatible with our system.

## 2023-03-04 NOTE — ED PROVIDER NOTE - OBJECTIVE STATEMENT
72-year-old female past medical history of hypertension, dyslipidemia, scolosis, prior pneumothorax with a chest tube, prior hip fracture presents to the emergency department referred from urgent care for a large left-sided pneumothorax.  Patient initially went to urgent care after having a dry cough for the past couple of weeks.  Patient denies any chest pain, shortness of breath, leg pain, calf pain, leg swelling.  Patient is eating and drinking normally.  Patient denies any fever or chills.

## 2023-03-04 NOTE — ED PROVIDER NOTE - CARE PROVIDERS DIRECT ADDRESSES
Airway patent, Nasal mucosa clear. Mouth with normal mucosa. Throat has no vesicles, no oropharyngeal exudates and uvula is midline. ,DirectAddress_Unknown

## 2023-03-04 NOTE — ED PROVIDER NOTE - ADDITIONAL NOTES AND INSTRUCTIONS:
Please we will get imaging from this visit and also CT scan and x-rays from 2019.  Imaging discussed with radiology and patient look like she had chest tube placement into a small loculation of the pneumothorax.  Chest tube was removed and patient was discharged with a large pneumothorax and was lost to follow-up.  Discussed with patient options and offered chest tube and she prefers to follow-up with pulmonary for specialist evaluation.  Patient vital signs are unremarkable and she is not short of breath.    Thanks,  Dr. Rodriguez

## 2023-05-15 ENCOUNTER — APPOINTMENT (OUTPATIENT)
Dept: PULMONOLOGY | Facility: CLINIC | Age: 73
End: 2023-05-15
Payer: MEDICARE

## 2023-05-15 VITALS
HEIGHT: 63 IN | DIASTOLIC BLOOD PRESSURE: 80 MMHG | SYSTOLIC BLOOD PRESSURE: 140 MMHG | WEIGHT: 91 LBS | BODY MASS INDEX: 16.12 KG/M2 | RESPIRATION RATE: 12 BRPM | OXYGEN SATURATION: 97 % | HEART RATE: 120 BPM

## 2023-05-15 DIAGNOSIS — Z86.39 PERSONAL HISTORY OF OTHER ENDOCRINE, NUTRITIONAL AND METABOLIC DISEASE: ICD-10-CM

## 2023-05-15 DIAGNOSIS — Z86.79 PERSONAL HISTORY OF OTHER DISEASES OF THE CIRCULATORY SYSTEM: ICD-10-CM

## 2023-05-15 PROCEDURE — 99204 OFFICE O/P NEW MOD 45 MIN: CPT

## 2023-05-15 NOTE — HISTORY OF PRESENT ILLNESS
[TextBox_4] : 72 years old presented for above patient had a fall in 2019 went to the hospital she had a chest x-ray as preop which show evidence of left large pneumothorax patient at that time denies shortness of breath she had chest tube placed for few days no changes in the lung finding she underwent a CAT scan of her chest which show giant bullae cannot rule out underlying pneumothorax.  She was supposed to follow-up with CT surgeon never did.  Few months ago she was complaining of cough went to CT MD chest x-ray was done which showed the same finding left large pneumothorax she was sent to the emergency room in the emergency room they compared it thousand 19 unchanged and she was sent home and she came in for follow-up visit she reports shortness of breath on exertion no cough no fever no weight loss non-smoker no history of lung disease no prior x-ray before 2019.

## 2023-05-15 NOTE — DISCUSSION/SUMMARY
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

## 2023-05-15 NOTE — PHYSICAL EXAM
[No Acute Distress] : no acute distress [Normal Oropharynx] : normal oropharynx [Normal Appearance] : normal appearance [No Neck Mass] : no neck mass [Normal Rate/Rhythm] : normal rate/rhythm [Normal S1, S2] : normal s1, s2 [No Murmurs] : no murmurs [No Resp Distress] : no resp distress [Kyphosis] : kyphosis [Scoliosis] : scoliosis [Benign] : benign [Normal Gait] : normal gait [No Clubbing] : no clubbing [No Cyanosis] : no cyanosis [No Edema] : no edema [FROM] : FROM [Normal Color/ Pigmentation] : normal color/ pigmentation [No Focal Deficits] : no focal deficits [Oriented x3] : oriented x3 [Normal Affect] : normal affect

## 2023-05-22 ENCOUNTER — NON-APPOINTMENT (OUTPATIENT)
Age: 73
End: 2023-05-22

## 2023-06-02 NOTE — BRIEF OPERATIVE NOTE - NSICDXBRIEFPREOP_GEN_ALL_CORE_FT
PRE-OP DIAGNOSIS:  Left displaced femoral neck fracture 23-Sep-2019 20:41:35  Alistair Collins
normal

## 2023-06-04 ENCOUNTER — RESULT REVIEW (OUTPATIENT)
Age: 73
End: 2023-06-04

## 2023-06-04 ENCOUNTER — OUTPATIENT (OUTPATIENT)
Dept: OUTPATIENT SERVICES | Facility: HOSPITAL | Age: 73
LOS: 1 days | End: 2023-06-04
Payer: MEDICARE

## 2023-06-04 DIAGNOSIS — Z00.8 ENCOUNTER FOR OTHER GENERAL EXAMINATION: ICD-10-CM

## 2023-06-04 DIAGNOSIS — R07.9 CHEST PAIN, UNSPECIFIED: ICD-10-CM

## 2023-06-04 PROCEDURE — 71250 CT THORAX DX C-: CPT | Mod: 26

## 2023-06-04 PROCEDURE — 71250 CT THORAX DX C-: CPT

## 2023-06-05 DIAGNOSIS — R07.9 CHEST PAIN, UNSPECIFIED: ICD-10-CM

## 2023-06-06 ENCOUNTER — APPOINTMENT (OUTPATIENT)
Dept: CARDIOTHORACIC SURGERY | Facility: CLINIC | Age: 73
End: 2023-06-06
Payer: MEDICARE

## 2023-06-06 VITALS
WEIGHT: 90 LBS | HEART RATE: 104 BPM | SYSTOLIC BLOOD PRESSURE: 140 MMHG | BODY MASS INDEX: 15.95 KG/M2 | DIASTOLIC BLOOD PRESSURE: 98 MMHG | HEIGHT: 63 IN | RESPIRATION RATE: 14 BRPM | OXYGEN SATURATION: 98 %

## 2023-06-06 PROCEDURE — 99204 OFFICE O/P NEW MOD 45 MIN: CPT

## 2023-06-06 RX ORDER — SIMVASTATIN 40 MG/1
40 TABLET, FILM COATED ORAL
Refills: 0 | Status: ACTIVE | COMMUNITY

## 2023-06-06 RX ORDER — AMLODIPINE BESYLATE 2.5 MG/1
2.5 TABLET ORAL
Refills: 0 | Status: ACTIVE | COMMUNITY

## 2023-06-06 RX ORDER — LEVOTHYROXINE SODIUM 25 UG/1
25 TABLET ORAL
Refills: 0 | Status: ACTIVE | COMMUNITY

## 2023-06-07 NOTE — PHYSICAL EXAM
[General Appearance - Alert] : alert [General Appearance - In No Acute Distress] : in no acute distress [Sclera] : the sclera and conjunctiva were normal [Outer Ear] : the ears and nose were normal in appearance [Neck Appearance] : the appearance of the neck was normal [] : no respiratory distress [Heart Rate And Rhythm] : heart rate was normal and rhythm regular [Bowel Sounds] : normal bowel sounds [Abdomen Soft] : soft [Involuntary Movements] : no involuntary movements were seen [Skin Color & Pigmentation] : normal skin color and pigmentation [No Focal Deficits] : no focal deficits [Oriented To Time, Place, And Person] : oriented to person, place, and time [Impaired Insight] : insight and judgment were intact

## 2023-06-08 NOTE — ASSESSMENT
[FreeTextEntry1] : Ms. SHOSHANA ZARATE is a 72 year F, never smoker, that arrives today for a consultation for a bullous disease, worse in [place]. \par \par Ct chest was reviewed with patient\par She is asymptomatic - denies any SOB - is active daily - watches grandchildren ages 2 and 5 and climbs 5 flight of stairs in her building\par Surgery was explained to the pt - bleb resection - length of surgery about 90 minutes and expected hospital stay about 1 -3 days\par At this time pt is unsure if she would like to proceed with surgery - she will discuss with her son and call office once she has made a decision\par If surgery - needs PAST, PFT, Cardio clearance \par \par I, Nery Osuna [facility], am acting as scribe for Dr. Jaron Krueger \par I, Scott Krueger saw, examined and reviewed the diagnostic images on patient:  SHOSHANA ZARATE on 06/06/2023 and agreed with my Nurse Practitioner's clinical note, physical exam findings and treatment plan.\par Patient presented to the office with diagnosis of bullous disease.  She is a 72-year-old female non-smoker, 3 years ago left pneumothorax diagnosed treated with percutaneous drain follow-up images revealed persistent pneumothorax but CT scan revealed large bullae.  Patient is very functional and active.  I reviewed the CT scan images in detail and given the size of the bulla, it is occupying at least 50 or 60% of the left pleural space I recommended surgical resection to improve aeration of the left lung and ventilatory reserve.  I described in detail robotic assisted left thoracoscopy bullectomy, risk and possible complications as well as expected recovery.  I discussed in detail possibility of prolonged air leak and put possibility the to go home with small drain.  Patient understood and she is going to discuss with her family and will notify the office regarding her final decision.\par

## 2023-06-08 NOTE — HISTORY OF PRESENT ILLNESS
[FreeTextEntry1] : Ms. SHOSHANA ZARATE is a 72 year F, never smoker, that arrives today for a consultation for a bullous disease, worse in [place]. PMHx s/p mechanical fall with L hip fracture with repair in 2019 - CT chest done at that time revealing pneumothorax - no resolution with chest tube, HTN, HLD. Here today for review of recent  CT Chest 6/4/2023 and surgical discussion \par \par CCS/ ECOG 0\par Lives with alone - no aid \par Never smoker\par Never covid - is vaccinated \par Denies major psychiatric history\par \par Their Healthcare team is as follows:\par PMD: NIMISHA PONCE\par Pulmonologist:  Amy\par \par

## 2023-11-20 ENCOUNTER — APPOINTMENT (OUTPATIENT)
Dept: PULMONOLOGY | Facility: CLINIC | Age: 73
End: 2023-11-20
Payer: MEDICARE

## 2023-11-20 VITALS
HEART RATE: 100 BPM | WEIGHT: 91 LBS | HEIGHT: 63 IN | OXYGEN SATURATION: 96 % | DIASTOLIC BLOOD PRESSURE: 88 MMHG | SYSTOLIC BLOOD PRESSURE: 140 MMHG | BODY MASS INDEX: 16.12 KG/M2

## 2023-11-20 PROCEDURE — 99213 OFFICE O/P EST LOW 20 MIN: CPT

## 2024-05-21 ENCOUNTER — APPOINTMENT (OUTPATIENT)
Dept: PULMONOLOGY | Facility: CLINIC | Age: 74
End: 2024-05-21
Payer: MEDICARE

## 2024-05-21 VITALS
SYSTOLIC BLOOD PRESSURE: 110 MMHG | HEIGHT: 63 IN | DIASTOLIC BLOOD PRESSURE: 70 MMHG | WEIGHT: 95 LBS | OXYGEN SATURATION: 96 % | BODY MASS INDEX: 16.83 KG/M2 | RESPIRATION RATE: 14 BRPM | HEART RATE: 82 BPM

## 2024-05-21 DIAGNOSIS — J93.9 PNEUMOTHORAX, UNSPECIFIED: ICD-10-CM

## 2024-05-21 DIAGNOSIS — J43.9 EMPHYSEMA, UNSPECIFIED: ICD-10-CM

## 2024-05-21 PROCEDURE — 99213 OFFICE O/P EST LOW 20 MIN: CPT

## 2024-05-21 PROCEDURE — G2211 COMPLEX E/M VISIT ADD ON: CPT

## 2024-05-21 NOTE — PHYSICAL EXAM
[No Acute Distress] : no acute distress [Normal Oropharynx] : normal oropharynx [Normal Appearance] : normal appearance [No Neck Mass] : no neck mass [Normal Rate/Rhythm] : normal rate/rhythm [Normal S1, S2] : normal s1, s2 [No Murmurs] : no murmurs [Kyphosis] : kyphosis [Scoliosis] : scoliosis [Benign] : benign [Normal Gait] : normal gait [No Clubbing] : no clubbing [No Cyanosis] : no cyanosis [No Edema] : no edema [FROM] : FROM [Normal Color/ Pigmentation] : normal color/ pigmentation [No Focal Deficits] : no focal deficits [Oriented x3] : oriented x3 [Normal Affect] : normal affect [TextBox_68] : DEC BS RACHEL SIDE

## 2024-05-21 NOTE — DISCUSSION/SUMMARY
[FreeTextEntry1] : - Large left-sided pneumothorax / trapped lung/ Bullae patient offers no symptoms status post left-sided chest tube with no expansion CT sx fup Pulse ox on exertion Chest x-ray chest CT 2019 reviewed by myself Chest x-ray 2023 were reviewed. chest ct

## 2024-08-04 ENCOUNTER — OUTPATIENT (OUTPATIENT)
Dept: OUTPATIENT SERVICES | Facility: HOSPITAL | Age: 74
LOS: 1 days | End: 2024-08-04
Payer: MEDICARE

## 2024-08-04 DIAGNOSIS — R91.1 SOLITARY PULMONARY NODULE: ICD-10-CM

## 2024-08-04 DIAGNOSIS — Z00.8 ENCOUNTER FOR OTHER GENERAL EXAMINATION: ICD-10-CM

## 2024-08-04 PROCEDURE — 71250 CT THORAX DX C-: CPT

## 2024-08-04 PROCEDURE — 71250 CT THORAX DX C-: CPT | Mod: 26

## 2024-08-05 DIAGNOSIS — R91.1 SOLITARY PULMONARY NODULE: ICD-10-CM

## 2024-08-16 ENCOUNTER — NON-APPOINTMENT (OUTPATIENT)
Age: 74
End: 2024-08-16

## 2024-11-22 ENCOUNTER — APPOINTMENT (OUTPATIENT)
Dept: PULMONOLOGY | Facility: CLINIC | Age: 74
End: 2024-11-22
Payer: MEDICARE

## 2024-11-22 VITALS
OXYGEN SATURATION: 95 % | HEART RATE: 97 BPM | DIASTOLIC BLOOD PRESSURE: 78 MMHG | HEIGHT: 63 IN | BODY MASS INDEX: 16.83 KG/M2 | SYSTOLIC BLOOD PRESSURE: 120 MMHG | RESPIRATION RATE: 14 BRPM | WEIGHT: 95 LBS

## 2024-11-22 DIAGNOSIS — J43.9 EMPHYSEMA, UNSPECIFIED: ICD-10-CM

## 2024-11-22 DIAGNOSIS — J93.9 PNEUMOTHORAX, UNSPECIFIED: ICD-10-CM

## 2024-11-22 PROCEDURE — 99213 OFFICE O/P EST LOW 20 MIN: CPT

## 2024-11-22 PROCEDURE — G2211 COMPLEX E/M VISIT ADD ON: CPT

## 2024-12-18 NOTE — OCCUPATIONAL THERAPY INITIAL EVALUATION ADULT - TOILETING, PREVIOUS LEVEL OF FUNCTION, OT EVAL
Start Bentyl for as needed for diarrhea and abdominal cramping.  Stop this medication if you develop any diarrhea.  Avoid taking Pepto-Bismol or any antidiarrheal such as loperamide or Imodium.  When taking Bentyl pain.    Plenty of water, rest.    [place] diet, advance diet as tolerated.  Minimize sugar intake.  Avoid greasy or fatty food.    Tylenol as needed for pain or fevers.    Follow with your primary care physicians for management of ongoing symptoms.  Go to ED for any worsening symptoms.   independent

## 2025-03-17 NOTE — PROCEDURAL SAFETY CHECKLIST WITH OR WITHOUT SEDATION - NSRESOLVEDISCREP_GEN_ALL_CORE
Copied from CRM #[id number]. Topic: MW Referral/Order - MW Referral/Order Request  >> Mar 17, 2025  5:31 PM Maria Ines F[facility]ENTES [facility] wrote:  Yessenia Hester called regarding a Referral (or Service to Order).      New referral/ service-to order requested have NOT discussed with provider; declined scheduling appointment.  Selected 'Wrap Up CRM' and created new Telephone Encounter after clicking 'Convert to Clinical Call'. Selected reason for call 'Referral'. Sent Referral message and routed as routine priority per Clinician KB page to appropriate clinician Pool.-- DO NOT REPLY / DO NOT REPLY ALL --  -- This inbox is not monitored. If this was sent to the wrong provider or department, reroute message to P ECO Reroute pool. --  -- Message is from Engagement Center Operations (ECO) --        Referral Request  Name of Specialist: Dr. Norbert Hernandez (appt is on 3/21)  Provider's specialty: Ophthalmology    Medical condition for referral:  follow up appt for abnormal eye exam on right eye    Is this a NEW request?: no      Referral ordered by: Dylan Tovar MD       Insurance type:       Payor:  [employer]VANTAGE / Plan:  [id number] / Product Type:  MEDICARE ADVANTAGE    Preferred Delivery Method  Please fax to office    Caller Information       Contact Date/Time Type Contact Phone/Fax    03/17/2025 05:29 PM CDT Phone (Incoming) Yessenia Hester [phone]            Alternative phone number: n/a    Can a detailed message be left?  Yes - Voicemail     Patient has been advised the message will be addressed within 2-3 business days        done

## 2025-05-28 ENCOUNTER — APPOINTMENT (OUTPATIENT)
Dept: PULMONOLOGY | Facility: CLINIC | Age: 75
End: 2025-05-28
Payer: MEDICARE

## 2025-05-28 VITALS
DIASTOLIC BLOOD PRESSURE: 80 MMHG | OXYGEN SATURATION: 95 % | WEIGHT: 95 LBS | SYSTOLIC BLOOD PRESSURE: 130 MMHG | HEART RATE: 93 BPM | RESPIRATION RATE: 15 BRPM | BODY MASS INDEX: 16.83 KG/M2 | HEIGHT: 63 IN

## 2025-05-28 DIAGNOSIS — J93.9 PNEUMOTHORAX, UNSPECIFIED: ICD-10-CM

## 2025-05-28 DIAGNOSIS — J43.9 EMPHYSEMA, UNSPECIFIED: ICD-10-CM

## 2025-05-28 PROCEDURE — G2211 COMPLEX E/M VISIT ADD ON: CPT

## 2025-05-28 PROCEDURE — 99213 OFFICE O/P EST LOW 20 MIN: CPT
